# Patient Record
Sex: MALE | Race: BLACK OR AFRICAN AMERICAN | Employment: OTHER | URBAN - METROPOLITAN AREA
[De-identification: names, ages, dates, MRNs, and addresses within clinical notes are randomized per-mention and may not be internally consistent; named-entity substitution may affect disease eponyms.]

---

## 2018-04-18 ENCOUNTER — APPOINTMENT (OUTPATIENT)
Dept: GENERAL RADIOLOGY | Age: 73
DRG: 309 | End: 2018-04-18
Attending: EMERGENCY MEDICINE
Payer: MEDICARE

## 2018-04-18 ENCOUNTER — APPOINTMENT (OUTPATIENT)
Dept: CT IMAGING | Age: 73
DRG: 309 | End: 2018-04-18
Attending: EMERGENCY MEDICINE
Payer: MEDICARE

## 2018-04-18 ENCOUNTER — HOSPITAL ENCOUNTER (INPATIENT)
Age: 73
LOS: 5 days | Discharge: HOME HEALTH | DRG: 309 | End: 2018-04-24
Attending: EMERGENCY MEDICINE | Admitting: INTERNAL MEDICINE
Payer: MEDICARE

## 2018-04-18 DIAGNOSIS — Z96.651 S/P TKR (TOTAL KNEE REPLACEMENT), RIGHT: ICD-10-CM

## 2018-04-18 DIAGNOSIS — N18.9 ACUTE RENAL FAILURE SUPERIMPOSED ON CHRONIC KIDNEY DISEASE, UNSPECIFIED CKD STAGE, UNSPECIFIED ACUTE RENAL FAILURE TYPE (HCC): ICD-10-CM

## 2018-04-18 DIAGNOSIS — I47.29 VENTRICULAR TACHYCARDIA, NON-SUSTAINED: ICD-10-CM

## 2018-04-18 DIAGNOSIS — N17.9 ACUTE RENAL FAILURE SUPERIMPOSED ON CHRONIC KIDNEY DISEASE, UNSPECIFIED CKD STAGE, UNSPECIFIED ACUTE RENAL FAILURE TYPE (HCC): ICD-10-CM

## 2018-04-18 DIAGNOSIS — I48.91 ATRIAL FIBRILLATION WITH RAPID VENTRICULAR RESPONSE (HCC): Primary | ICD-10-CM

## 2018-04-18 LAB
ANION GAP SERPL CALC-SCNC: 9 MMOL/L (ref 3–18)
APTT PPP: 34.8 SEC (ref 23–36.4)
BASOPHILS # BLD: 0 K/UL (ref 0–0.06)
BASOPHILS NFR BLD: 0 % (ref 0–3)
BUN SERPL-MCNC: 51 MG/DL (ref 7–18)
BUN/CREAT SERPL: 15 (ref 12–20)
CALCIUM SERPL-MCNC: 8.9 MG/DL (ref 8.5–10.1)
CHLORIDE SERPL-SCNC: 98 MMOL/L (ref 100–108)
CO2 SERPL-SCNC: 26 MMOL/L (ref 21–32)
CREAT SERPL-MCNC: 3.4 MG/DL (ref 0.6–1.3)
DIFFERENTIAL METHOD BLD: ABNORMAL
EOSINOPHIL # BLD: 0.1 K/UL (ref 0–0.4)
EOSINOPHIL NFR BLD: 1 % (ref 0–5)
ERYTHROCYTE [DISTWIDTH] IN BLOOD BY AUTOMATED COUNT: 15.5 % (ref 11.6–14.5)
GLUCOSE SERPL-MCNC: 150 MG/DL (ref 74–99)
HCT VFR BLD AUTO: 33.6 % (ref 36–48)
HGB BLD-MCNC: 11.3 G/DL (ref 13–16)
INR PPP: 1.6 (ref 0.8–1.2)
LYMPHOCYTES # BLD: 1.1 K/UL (ref 0.8–3.5)
LYMPHOCYTES NFR BLD: 14 % (ref 20–51)
MCH RBC QN AUTO: 30.6 PG (ref 24–34)
MCHC RBC AUTO-ENTMCNC: 33.6 G/DL (ref 31–37)
MCV RBC AUTO: 91.1 FL (ref 74–97)
MONOCYTES # BLD: 0.6 K/UL (ref 0–1)
MONOCYTES NFR BLD: 7 % (ref 2–9)
NEUTS SEG # BLD: 6.4 K/UL (ref 1.8–8)
NEUTS SEG NFR BLD: 78 % (ref 42–75)
NRBC BLD-RTO: 6 PER 100 WBC
PLATELET # BLD AUTO: 196 K/UL (ref 135–420)
PLATELET COMMENTS,PCOM: ABNORMAL
PMV BLD AUTO: 11.5 FL (ref 9.2–11.8)
POTASSIUM SERPL-SCNC: 4.2 MMOL/L (ref 3.5–5.5)
PROTHROMBIN TIME: 18.6 SEC (ref 11.5–15.2)
RBC # BLD AUTO: 3.69 M/UL (ref 4.7–5.5)
RBC MORPH BLD: ABNORMAL
RBC MORPH BLD: ABNORMAL
SODIUM SERPL-SCNC: 133 MMOL/L (ref 136–145)
TROPONIN I SERPL-MCNC: 0.08 NG/ML (ref 0–0.04)
TROPONIN I SERPL-MCNC: 0.08 NG/ML (ref 0–0.04)
WBC # BLD AUTO: 8.2 K/UL (ref 4.6–13.2)

## 2018-04-18 PROCEDURE — 80048 BASIC METABOLIC PNL TOTAL CA: CPT | Performed by: PHYSICIAN ASSISTANT

## 2018-04-18 PROCEDURE — 85610 PROTHROMBIN TIME: CPT | Performed by: PHYSICIAN ASSISTANT

## 2018-04-18 PROCEDURE — 84484 ASSAY OF TROPONIN QUANT: CPT | Performed by: PHYSICIAN ASSISTANT

## 2018-04-18 PROCEDURE — 74011250637 HC RX REV CODE- 250/637: Performed by: EMERGENCY MEDICINE

## 2018-04-18 PROCEDURE — 93971 EXTREMITY STUDY: CPT

## 2018-04-18 PROCEDURE — 85730 THROMBOPLASTIN TIME PARTIAL: CPT | Performed by: EMERGENCY MEDICINE

## 2018-04-18 PROCEDURE — 96361 HYDRATE IV INFUSION ADD-ON: CPT

## 2018-04-18 PROCEDURE — 96366 THER/PROPH/DIAG IV INF ADDON: CPT

## 2018-04-18 PROCEDURE — 93005 ELECTROCARDIOGRAM TRACING: CPT

## 2018-04-18 PROCEDURE — 74011000258 HC RX REV CODE- 258: Performed by: EMERGENCY MEDICINE

## 2018-04-18 PROCEDURE — 85025 COMPLETE CBC W/AUTO DIFF WBC: CPT | Performed by: PHYSICIAN ASSISTANT

## 2018-04-18 PROCEDURE — 94762 N-INVAS EAR/PLS OXIMTRY CONT: CPT

## 2018-04-18 PROCEDURE — 96365 THER/PROPH/DIAG IV INF INIT: CPT

## 2018-04-18 PROCEDURE — 74022 RADEX COMPL AQT ABD SERIES: CPT

## 2018-04-18 PROCEDURE — 74011000250 HC RX REV CODE- 250: Performed by: EMERGENCY MEDICINE

## 2018-04-18 PROCEDURE — 74011250636 HC RX REV CODE- 250/636: Performed by: EMERGENCY MEDICINE

## 2018-04-18 PROCEDURE — 96375 TX/PRO/DX INJ NEW DRUG ADDON: CPT

## 2018-04-18 PROCEDURE — 96367 TX/PROPH/DG ADDL SEQ IV INF: CPT

## 2018-04-18 PROCEDURE — 99285 EMERGENCY DEPT VISIT HI MDM: CPT

## 2018-04-18 RX ORDER — DILTIAZEM HYDROCHLORIDE 5 MG/ML
25 INJECTION INTRAVENOUS
Status: COMPLETED | OUTPATIENT
Start: 2018-04-18 | End: 2018-04-18

## 2018-04-18 RX ORDER — DILTIAZEM HYDROCHLORIDE 5 MG/ML
20 INJECTION INTRAVENOUS
Status: COMPLETED | OUTPATIENT
Start: 2018-04-18 | End: 2018-04-18

## 2018-04-18 RX ORDER — GUAIFENESIN 100 MG/5ML
324 LIQUID (ML) ORAL
Status: COMPLETED | OUTPATIENT
Start: 2018-04-18 | End: 2018-04-18

## 2018-04-18 RX ORDER — HEPARIN SODIUM 10000 [USP'U]/100ML
18-36 INJECTION, SOLUTION INTRAVENOUS
Status: DISCONTINUED | OUTPATIENT
Start: 2018-04-18 | End: 2018-04-24 | Stop reason: HOSPADM

## 2018-04-18 RX ORDER — AMIODARONE HYDROCHLORIDE 150 MG/3ML
150 INJECTION, SOLUTION INTRAVENOUS ONCE
Status: COMPLETED | OUTPATIENT
Start: 2018-04-18 | End: 2018-04-18

## 2018-04-18 RX ORDER — AMIODARONE HCL/D5W 450 MG/250
0.5-1 PLASTIC BAG, INJECTION (ML) INTRAVENOUS CONTINUOUS
Status: DISCONTINUED | OUTPATIENT
Start: 2018-04-18 | End: 2018-04-18

## 2018-04-18 RX ADMIN — ASPIRIN 81 MG 324 MG: 81 TABLET ORAL at 21:44

## 2018-04-18 RX ADMIN — AMIODARONE HYDROCHLORIDE 0.5 MG/MIN: 1.8 INJECTION, SOLUTION INTRAVENOUS at 23:27

## 2018-04-18 RX ADMIN — SODIUM CHLORIDE 1000 ML: 900 INJECTION, SOLUTION INTRAVENOUS at 19:54

## 2018-04-18 RX ADMIN — AMIODARONE HYDROCHLORIDE 150 MG: 50 INJECTION, SOLUTION INTRAVENOUS at 23:23

## 2018-04-18 RX ADMIN — DILTIAZEM HYDROCHLORIDE 20 MG: 5 INJECTION INTRAVENOUS at 19:57

## 2018-04-18 RX ADMIN — SODIUM CHLORIDE 5 MG/HR: 900 INJECTION, SOLUTION INTRAVENOUS at 20:25

## 2018-04-18 RX ADMIN — SODIUM CHLORIDE 1000 ML: 900 INJECTION, SOLUTION INTRAVENOUS at 22:08

## 2018-04-18 RX ADMIN — HEPARIN SODIUM 18 UNITS/KG/HR: 10000 INJECTION, SOLUTION INTRAVENOUS at 21:46

## 2018-04-18 RX ADMIN — SODIUM CHLORIDE 1000 ML: 900 INJECTION, SOLUTION INTRAVENOUS at 20:45

## 2018-04-18 RX ADMIN — DILTIAZEM HYDROCHLORIDE 25 MG: 5 INJECTION INTRAVENOUS at 20:16

## 2018-04-18 NOTE — ED PROVIDER NOTES
EMERGENCY DEPARTMENT HISTORY AND PHYSICAL EXAM    7:40 PM      Date: 4/18/2018  Patient Name: Sonya Aldrich    History of Presenting Illness     No chief complaint on file. History Provided By: Patient    Chief Complaint: SOB  Duration:  Days  Timing:  Constant  Location: lungs  Modifying Factors: Pt had right knee surgery 4 days ago and has had his sob ever since. He believes that he was sent home from the hospital too soon. Associated Symptoms: vomiting and light headedness. Denies fever, back pain, abd pain, and CP. Additional History (Context): Sonya Aldrich is a 71 y.o. male with A FIB who presents with constant SOB over the last 4 days. Associated sx are vomiting and light headedness. Denies fever, back pain, abd pain, and CP. Pt had right knee surgery 4 days ago and has had his sob ever since. He believes that he was sent home from the hospital too soon. Pt is on Warfarin and was taking lopressor but stopped taking it. PCP: Berny Canales MD        Past History     Past Medical History:  No past medical history on file. Past Surgical History:  No past surgical history on file. Family History:  No family history on file. Social History:  Social History   Substance Use Topics    Smoking status: Not on file    Smokeless tobacco: Not on file    Alcohol use Not on file       Allergies:  No Known Allergies      Review of Systems       Review of Systems   Constitutional: Negative for fever. Respiratory: Positive for shortness of breath. Cardiovascular: Negative for chest pain. Gastrointestinal: Positive for vomiting. Negative for abdominal pain. Musculoskeletal: Negative for back pain. Neurological: Positive for light-headedness. All other systems reviewed and are negative.         Physical Exam     Visit Vitals    /87    Pulse (!) 152    Temp 98.3 °F (36.8 °C)    Resp 28    Ht 5' 11\" (1.803 m)    Wt 92.5 kg (204 lb)    SpO2 95%    BMI 28.45 kg/m2 Physical Exam   Constitutional:   General:  Well-developed, well-nourished, speaking in full sentences normal mentation not warm to touch nontoxic nondiaphoretic. Head:  Normocephalic atraumatic. Eyes:  Pupils midrange extraocular movements intact. No pallor or conjunctival injection. Nose:  No rhinorrhea, inspection grossly normal.    Ears:  Grossly normal to inspection, no discharge. Mouth:  Mucous membranes moist, no appreciable intraoral lesion. Neck/Back:  Trachea midline, no asymmetry. Chest:  Grossly normal inspection, symmetric chest rise. Pulmonary:  Clear to auscultation bilaterally no wheezes rhonchi or rales. Cardiovascular: Not regular, tachycardic. Abdomen: Soft, nontender, nondistended no guarding rebound or peritoneal signs. Extremities:  Grossly normal to inspection, peripheral pulses intact  bilateral lower extremity is in compression stockings, 2+ dorsalis pedis pulses. Dressing is clean dry intact and there is no calf tenderness no asymmetry appreciated  Neurologic:  Alert and oriented no appreciable focal neurologic deficit. Skin:  Warm and dry  Psychiatric:  Grossly normal mood and affect. Nursing note reviewed, vital signs reviewed.            Diagnostic Study Results     Labs -  Recent Results (from the past 12 hour(s))   EKG, 12 LEAD, INITIAL    Collection Time: 04/18/18  7:39 PM   Result Value Ref Range    Ventricular Rate 158 BPM    Atrial Rate 163 BPM    QRS Duration 78 ms    Q-T Interval 296 ms    QTC Calculation (Bezet) 480 ms    Calculated R Axis -16 degrees    Calculated T Axis 74 degrees    Diagnosis       Atrial fibrillation with rapid ventricular response with premature   ventricular or aberrantly conducted complexes  Abnormal ECG  No previous ECGs available     CBC WITH AUTOMATED DIFF    Collection Time: 04/18/18  7:49 PM   Result Value Ref Range    WBC 8.2 4.6 - 13.2 K/uL    RBC 3.69 (L) 4.70 - 5.50 M/uL    HGB 11.3 (L) 13.0 - 16.0 g/dL    HCT 33.6 (L) 36.0 - 48.0 %    MCV 91.1 74.0 - 97.0 FL    MCH 30.6 24.0 - 34.0 PG    MCHC 33.6 31.0 - 37.0 g/dL    RDW 15.5 (H) 11.6 - 14.5 %    PLATELET 985 158 - 975 K/uL    MPV 11.5 9.2 - 11.8 FL    NEUTROPHILS 78 (H) 42 - 75 %    LYMPHOCYTES 14 (L) 20 - 51 %    MONOCYTES 7 2 - 9 %    EOSINOPHILS 1 0 - 5 %    BASOPHILS 0 0 - 3 %    NRBC 6.0 (H) 0  WBC    ABS. NEUTROPHILS 6.4 1.8 - 8.0 K/UL    ABS. LYMPHOCYTES 1.1 0.8 - 3.5 K/UL    ABS. MONOCYTES 0.6 0 - 1.0 K/UL    ABS. EOSINOPHILS 0.1 0.0 - 0.4 K/UL    ABS. BASOPHILS 0.0 0.0 - 0.06 K/UL    DF MANUAL      PLATELET COMMENTS ADEQUATE PLATELETS      RBC COMMENTS ANISOCYTOSIS  1+        RBC COMMENTS POLYCHROMASIA  1+       METABOLIC PANEL, BASIC    Collection Time: 04/18/18  7:49 PM   Result Value Ref Range    Sodium 133 (L) 136 - 145 mmol/L    Potassium 4.2 3.5 - 5.5 mmol/L    Chloride 98 (L) 100 - 108 mmol/L    CO2 26 21 - 32 mmol/L    Anion gap 9 3.0 - 18 mmol/L    Glucose 150 (H) 74 - 99 mg/dL    BUN 51 (H) 7.0 - 18 MG/DL    Creatinine 3.40 (H) 0.6 - 1.3 MG/DL    BUN/Creatinine ratio 15 12 - 20      GFR est AA 22 (L) >60 ml/min/1.73m2    GFR est non-AA 18 (L) >60 ml/min/1.73m2    Calcium 8.9 8.5 - 10.1 MG/DL   PROTHROMBIN TIME + INR    Collection Time: 04/18/18  7:49 PM   Result Value Ref Range    Prothrombin time 18.6 (H) 11.5 - 15.2 sec    INR 1.6 (H) 0.8 - 1.2     TROPONIN I    Collection Time: 04/18/18  7:49 PM   Result Value Ref Range    Troponin-I, Qt. 0.08 (H) 0.0 - 0.045 NG/ML   PTT    Collection Time: 04/18/18  7:49 PM   Result Value Ref Range    aPTT 34.8 23.0 - 36.4 SEC       Radiologic Studies -   DUPLEX LOWER EXT VENOUS RIGHT         XR ABD ACUTE W 1 V CHEST   Final Result      NM LUNG PERFUSION DONAVAN DIFF    (Results Pending)         Medical Decision Making   I am the first provider for this patient.     I reviewed the vital signs, available nursing notes, past medical history, past surgical history, family history and social history. Vital Signs-Reviewed the patient's vital signs. ED course:  Patient presents with rapid atrial fibrillation found by his home health visiting nurse. Had total knee replacement done April 11, primary concern is for PE, he also has nausea and vomiting unable tolerate his oral Lopressor history of A. fib and on Coumadin. He could also have a recurrence of his A. fib because of his medication noncompliance, his abdomen is completely benign less suspicious for intra-abdominal process. We'll continue to monitor. Abdominal pain was preceded by vomiting, he had no abdominal pain to start, no tenderness at this time, doubtful a primary intra-abdominal process    EMR reviewed, last EF 50%    Monitor: Rapid atrial fibrillation    EKG done at 1939 hrs.: Atrial fibrillation heart rate is 158, rapid ventricular response, no ST changes after initial bolus of diltiazem heart rate in the 120s to 150s, blood pressure not hypotensive we'll rebolus and start drip    After 2nd bolus, blood pressure transiently hypotensive, started diltiazem drip since his blood pressures come back up to the high 90s he remains asymptomatic. Labs:  creatinine was 3.4 potassium 4.2, prior at [facility] was milligrams of 1.8, is acute on chronic renal failure hemoglobin 11.3 white count 8.2 which is not elevated. His INR was subtherapeutic at 1.6     Consult:  Discussed care with MINDY Soto. Standard discussion; including history of patients chief complaint, available diagnostic results, and treatment course. Agree with empiric heparinization    Unable to perform CTA, will  perform duplex of lower extremity and plan for admission    Abdomen reexamined, completely benign no guarding rebound or peritoneal signs, had a watery stool today, passing\" nothing but gas\" recently.   We'll suspicion for bowel obstruction we'll plan for chest and abdomen x-ray this pupils      Reevaluated at 2100 hrs., heart rate is in the 150s blood pressure 139/110, slowly titrating her diltiazem because of episode of hypotension which bleeding is more related to his point depletion from his vomiting. Reevaluated at 2200 hrs.: Persistent tachycardic in the 130s blood pressure 109/93      Patient's presentation, history, physical exam and laboratory evaluations were reviewed. I felt the patient would benefit from inpatient management and treatment. Consult:  Discussed care with Dr. Stephen Dang. Standard discussion; including history of patients chief complaint, available diagnostic results, and treatment course. Patient was accepted to their service. Consult:  Discussed care with Hayden Salazar. Standard discussion; including history of patients chief complaint, available diagnostic results, and treatment course. Disposition:    Admitted to intensive care unit      Portions of this chart were created with Dragon medical speech to text program.   Unrecognized errors may be present. I have spent 65 minutes of critical care time (excluding time for other separate services) involved in lab review, consultations with specialist, family decision-making, and documentation. During this entire length of time I was immediately available to the patient. Critical Care: The reason for providing this level of medical care for this critically ill patient was due a critical illness that impaired one or more vital organ systems such that there was a high probability of imminent or life threatening deterioration in the patients condition. This care involved high complexity decision making to assess, manipulate, and support vital system functions, to treat this degree of vital organ system failure and to prevent further life threatening deterioration of the patients condition.          Follow-up Information     None           Patient's Medications    No medications on file     _______________________________    Attestations:  [address] [place] [address] Guru Mccarthy acting as a scribe for and in the presence of Doni Espino MD      April 18, 2018 at 7:40 PM       Provider Attestation:      I personally performed the services described in the documentation, reviewed the documentation, as recorded by the scribe in my presence, and it accurately and completely records my words and actions.  April 18, 2018 at 7:40 PM - Doni Espino MD    _______________________________

## 2018-04-18 NOTE — IP AVS SNAPSHOT
[address] Patient: Sharda Rolle MRN: [id number] :1945 A check horacio indicates which time of day the medication should be taken. My Medications START taking these medications Instructions Each Dose to Equal  
 Morning Noon Evening Bedtime  
 bisacodyl 10 mg suppository Commonly known as:  DULCOLAX Your last dose was: Your next dose is: Insert 10 mg into rectum daily as needed. 10 mg  
    
   
   
   
  
 bumetanide 1 mg tablet Commonly known as:  Melia Booth Your last dose was: Your next dose is: Take 1 Tab by mouth two (2) times a day. 1 mg  
    
   
   
   
  
 dilTIAZem  mg ER capsule Commonly known as:  CARDIZEM CD Your last dose was: Your next dose is: Take 1 Cap by mouth daily. 180 mg  
    
   
   
   
  
 docusate sodium 100 mg capsule Commonly known as:  Ki Hughes Your last dose was: Your next dose is: Take 1 Cap by mouth two (2) times a day. 100 mg  
    
   
   
   
  
 metoprolol tartrate 100 mg IR tablet Commonly known as:  LOPRESSOR Your last dose was: Your next dose is: Take 1 Tab by mouth every twelve (12) hours. 100 mg  
    
   
   
   
  
 OTHER Your last dose was: Your next dose is:    
   
   
 Check CBC, CMP, Mg. PT, INR on 18- Results to PCP immediately. Diagnosis- A fib OTHER Your last dose was: Your next dose is:    
   
   
 Incentive Spirometry- Use as directed  
     
   
   
   
  
 oxyCODONE-acetaminophen 5-325 mg per tablet Commonly known as:  PERCOCET Your last dose was: Your next dose is: Take 1 Tab by mouth every six (6) hours as needed. Max Daily Amount: 4 Tabs. 1 Tab tiotropium 18 mcg inhalation capsule Commonly known as:  Alvera Romberg Your last dose was: Your next dose is: Take 1 Cap by inhalation daily. 1 Cap  
    
   
   
   
  
 warfarin 6 mg tablet Commonly known as:  COUMADIN Your last dose was: Your next dose is:    
   
   
 6 mg PO daily through 4/26/18, and then as per PCP recommendations Where to Get Your Medications Information on where to get these meds will be given to you by the nurse or doctor. ! Ask your nurse or doctor about these medications  
  bisacodyl 10 mg suppository  
 bumetanide 1 mg tablet  
 dilTIAZem  mg ER capsule  
 docusate sodium 100 mg capsule  
 metoprolol tartrate 100 mg IR tablet OTHER  
 OTHER  
 oxyCODONE-acetaminophen 5-325 mg per tablet  
 tiotropium 18 mcg inhalation capsule  
 warfarin 6 mg tablet

## 2018-04-18 NOTE — IP AVS SNAPSHOT
Braeden Johnson 
 
 
 [address] Patient: Karely Santiago MRN: [id number] :1945 About your hospitalization You were admitted on:  2018 You last received care in the:  [address] [place] [address] You were discharged on:  2018 Why you were hospitalized Your primary diagnosis was:  Rapid Atrial Fibrillation (Hcc) Your diagnoses also included:  Knee Pain, Atrial Fibrillation With Rapid Ventricular Response (Hcc), Acute Kidney Injury (Hcc) Follow-up Information Follow up With Details Comments Contact Info Tess Miller MD On 5/3/2018 @1130am [address] [place] [address] [address] [facility] [address] 
[phone] Discharge Orders None A check horacio indicates which time of day the medication should be taken. My Medications START taking these medications Instructions Each Dose to Equal  
 Morning Noon Evening Bedtime  
 bisacodyl 10 mg suppository Commonly known as:  DULCOLAX Your last dose was: Your next dose is: Insert 10 mg into rectum daily as needed. 10 mg  
    
   
   
   
  
 bumetanide 1 mg tablet Commonly known as:  Gonzalez Patron Your last dose was: Your next dose is: Take 1 Tab by mouth two (2) times a day. 1 mg  
    
   
   
   
  
 dilTIAZem  mg ER capsule Commonly known as:  CARDIZEM CD Your last dose was: Your next dose is: Take 1 Cap by mouth daily. 180 mg  
    
   
   
   
  
 docusate sodium 100 mg capsule Commonly known as:  Paola Busing Your last dose was: Your next dose is: Take 1 Cap by mouth two (2) times a day. 100 mg  
    
   
   
   
  
 metoprolol tartrate 100 mg IR tablet Commonly known as:  LOPRESSOR Your last dose was: Your next dose is: Take 1 Tab by mouth every twelve (12) hours.   
 100 mg  
    
 OTHER Your last dose was: Your next dose is:    
   
   
 Check CBC, CMP, Mg. PT, INR on 4/26/18- Results to PCP immediately. Diagnosis- A fib OTHER Your last dose was: Your next dose is:    
   
   
 Incentive Spirometry- Use as directed  
     
   
   
   
  
 oxyCODONE-acetaminophen 5-325 mg per tablet Commonly known as:  PERCOCET Your last dose was: Your next dose is: Take 1 Tab by mouth every six (6) hours as needed. Max Daily Amount: 4 Tabs. 1 Tab  
    
   
   
   
  
 tiotropium 18 mcg inhalation capsule Commonly known as:  Angela Bye Your last dose was: Your next dose is: Take 1 Cap by inhalation daily. 1 Cap  
    
   
   
   
  
 warfarin 6 mg tablet Commonly known as:  COUMADIN Your last dose was: Your next dose is:    
   
   
 6 mg PO daily through 4/26/18, and then as per PCP recommendations Where to Get Your Medications Information on where to get these meds will be given to you by the nurse or doctor. ! Ask your nurse or doctor about these medications  
  bisacodyl 10 mg suppository  
 bumetanide 1 mg tablet  
 dilTIAZem  mg ER capsule  
 docusate sodium 100 mg capsule  
 metoprolol tartrate 100 mg IR tablet OTHER  
 OTHER  
 oxyCODONE-acetaminophen 5-325 mg per tablet  
 tiotropium 18 mcg inhalation capsule  
 warfarin 6 mg tablet Opioid Education Prescription Opioids: What You Need to Know: 
 
Prescription opioids can be used to help relieve moderate-to-severe pain and are often prescribed following a surgery or injury, or for certain health conditions. These medications can be an important part of treatment but also come with serious risks. Opioids are strong pain medicines.  Examples include hydrocodone, oxycodone, fentanyl, and morphine. Heroin is an example of an illegal opioid. It is important to work with your health care provider to make sure you are getting the safest, most effective care. WHAT ARE THE RISKS AND SIDE EFFECTS OF OPIOID USE? Prescription opioids carry serious risks of addiction and overdose, especially with prolonged use. An opioid overdose, often marked by slow breathing, can cause sudden death. The use of prescription opioids can have a number of side effects as well, even when taken as directed. · Tolerance-meaning you might need to take more of a medication for the same pain relief · Physical dependence-meaning you have symptoms of withdrawal when the medication is stopped. Withdrawal symptoms can include nausea, sweating, chills, diarrhea, stomach cramps, and muscle aches. Withdrawal can last up to several weeks, depending on which drug you took and how long you took it. · Increased sensitivity to pain · Constipation · Nausea, vomiting, and dry mouth · Sleepiness and dizziness · Confusion · Depression · Low levels of testosterone that can result in lower sex drive, energy, and strength · Itching and sweating RISKS ARE GREATER WITH:      
· History of drug misuse, substance use disorder, or overdose · Mental health conditions (such as depression or anxiety) · Sleep apnea · Older age (72 years or older) · Pregnancy Avoid alcohol while taking prescription opioids. Also, unless specifically advised by your health care provider, medications to avoid include: · Benzodiazepines (such as Xanax or Valium) · Muscle relaxants (such as Soma or Flexeril) · Hypnotics (such as Ambien or Lunesta) · Other prescription opioids KNOW YOUR OPTIONS Talk to your health care provider about ways to manage your pain that don't involve prescription opioids. Some of these options may actually work better and have fewer risks and side effects. Options may include: · Pain relievers such as acetaminophen, ibuprofen, and naproxen · Some medications that are also used for depression or seizures · Physical therapy and exercise · Counseling to help patients learn how to cope better with triggers of pain and stress. · Application of heat or cold compress · Massage therapy · Relaxation techniques Be Informed Make sure you know the name of your medication, how much and how often to take it, and its potential risks & side effects. IF YOU ARE PRESCRIBED OPIOIDS FOR PAIN: 
· Never take opioids in greater amounts or more often than prescribed. Remember the goal is not to be pain-free but to manage your pain at a tolerable level. · Follow up with your primary care provider to: · Work together to create a plan on how to manage your pain. · Talk about ways to help manage your pain that don't involve prescription opioids. · Talk about any and all concerns and side effects. · Help prevent misuse and abuse. · Never sell or share prescription opioids · Help prevent misuse and abuse. · Store prescription opioids in a secure place and out of reach of others (this may include visitors, children, friends, and family). · Safely dispose of unused/unwanted prescription opioids: Find your community drug take-back program or your pharmacy mail-back program, or flush them down the toilet, following guidance from the Food and Drug Administration (www.fda.gov/Drugs/ResourcesForYou). · Visit www.cdc.gov/drugoverdose to learn about the risks of opioid abuse and overdose. · If you believe you may be struggling with addiction, tell your health care provider and ask for guidance or call [facility] at 4-385-588-YNMO. Discharge Instructions Atrial Fibrillation: Care Instructions Your Care Instructions Atrial fibrillation is an irregular and often fast heartbeat.  Treating this condition is important for several reasons. It can cause blood clots, which can travel from your heart to your brain and cause a stroke. If you have a fast heartbeat, you may feel lightheaded, dizzy, and weak. An irregular heartbeat can also increase your risk for heart failure. Atrial fibrillation is often the result of another heart condition, such as high blood pressure or coronary artery disease. Making changes to improve your heart condition will help you stay healthy and active. Follow-up care is a key part of your treatment and safety. Be sure to make and go to all appointments, and call your doctor if you are having problems. It's also a good idea to know your test results and keep a list of the medicines you take. How can you care for yourself at home? Medicines ? · Take your medicines exactly as prescribed. Call your doctor if you think you are having a problem with your medicine. You will get more details on the specific medicines your doctor prescribes. ? · If your doctor has given you a blood thinner to prevent a stroke, be sure you get instructions about how to take your medicine safely. Blood thinners can cause serious bleeding problems. ? · Do not take any vitamins, over-the-counter drugs, or herbal products without talking to your doctor first. ? Lifestyle changes ? · Do not smoke. Smoking can increase your chance of a stroke and heart attack. If you need help quitting, talk to your doctor about stop-smoking programs and medicines. These can increase your chances of quitting for good. ? · Eat a heart-healthy diet. ? · Stay at a healthy weight. Lose weight if you need to.  
? · Limit alcohol to 2 drinks a day for men and 1 drink a day for women. Too much alcohol can cause health problems. ? · Avoid colds and flu. Get a pneumococcal vaccine shot. If you have had one before, ask your doctor whether you need another dose.  Get a flu shot every year. If you must be around people with colds or flu, wash your hands often. Activity ? · If your doctor recommends it, get more exercise. Walking is a good choice. Bit by bit, increase the amount you walk every day. Try for at least 30 minutes on most days of the week. You also may want to swim, bike, or do other activities. Your doctor may suggest that you join a cardiac rehabilitation program so that you can have help increasing your physical activity safely. ? · Start light exercise if your doctor says it is okay. Even a small amount will help you get stronger, have more energy, and manage stress. Walking is an easy way to get exercise. Start out by walking a little more than you did in the hospital. Gradually increase the amount you walk. ? · When you exercise, watch for signs that your heart is working too hard. You are pushing too hard if you cannot talk while you are exercising. If you become short of breath or dizzy or have chest pain, sit down and rest immediately. ? · Check your pulse regularly. Place two fingers on the artery at the palm side of your wrist, in line with your thumb. If your heartbeat seems uneven or fast, talk to your doctor. When should you call for help? Call 911 anytime you think you may need emergency care. For example, call if: 
? · You have symptoms of a heart attack. These may include: ¨ Chest pain or pressure, or a strange feeling in the chest. 
¨ Sweating. ¨ Shortness of breath. ¨ Nausea or vomiting. ¨ Pain, pressure, or a strange feeling in the back, neck, jaw, or upper belly or in one or both shoulders or arms. ¨ Lightheadedness or sudden weakness. ¨ A fast or irregular heartbeat. After you call 911, the  may tell you to chew 1 adult-strength or 2 to 4 low-dose aspirin. Wait for an ambulance. Do not try to drive yourself. ? · You have symptoms of a stroke. These may include: ¨ Sudden numbness, tingling, weakness, or loss of movement in your face, arm, or leg, especially on only one side of your body. ¨ Sudden vision changes. ¨ Sudden trouble speaking. ¨ Sudden confusion or trouble understanding simple statements. ¨ Sudden problems with walking or balance. ¨ A sudden, severe headache that is different from past headaches. ? · You passed out (lost consciousness). ?Call your doctor now or seek immediate medical care if: 
? · You have new or increased shortness of breath. ? · You feel dizzy or lightheaded, or you feel like you may faint. ? · Your heart rate becomes irregular. ? · You can feel your heart flutter in your chest or skip heartbeats. Tell your doctor if these symptoms are new or worse. ? Watch closely for changes in your health, and be sure to contact your doctor if you have any problems. Where can you learn more? Go to http://vikash-tulio.info/. Enter U020 in the search box to learn more about \"Atrial Fibrillation: Care Instructions. \" Current as of: September 21, 2016 Content Version: 11.4 © 8178-0704 VILOOP. Care instructions adapted under license by [employer] (which disclaims liability or warranty for this information). If you have questions about a medical condition or this instruction, always ask your healthcare professional. [place] [address] any warranty or liability for your use of this information. Learning About Atrial Fibrillation What is atrial fibrillation? Atrial fibrillation (say \"AY-tree-joão kfu-dknv-SRC-shun\") is the most common type of irregular heartbeat (arrhythmia). Normally, the heart beats in a strong, steady rhythm. In atrial fibrillation, a problem with the heart's electrical system causes the two upper parts of the heart (the atria) to quiver, or fibrillate.  Your heart rate also may be faster than normal. 
 Atrial fibrillation can be dangerous because if the heartbeat isn't strong and steady, blood can collect, or pool, in the atria. And pooled blood is more likely to form clots. Clots can travel to the brain, block blood flow, and cause a stroke. Atrial fibrillation can also lead to heart failure. Treatment for atrial fibrillation helps prevent stroke and heart failure. It also helps relieve symptoms. Atrial fibrillation is often caused by another heart problem. It may happen after heart surgery. It may also be caused by other problems, such as an overactive thyroid gland or lung disease. Many people with atrial fibrillation are able to live full and active lives. What are the symptoms? Some people feel symptoms when they have episodes of atrial fibrillation. But other people don't notice any symptoms. If you have symptoms, you may feel: · A fluttering, racing, or pounding feeling in your chest called palpitations. · Weak or tired. · Dizzy or lightheaded. · Short of breath. · Chest pain. · Confused. You may notice signs of atrial fibrillation when you check your pulse. Your pulse may seem uneven or fast. 
What can you expect when you have atrial fibrillation? At first, spells of atrial fibrillation may come on suddenly and last a short time. It may go away on its own or it goes away after treatment. This is called paroxysmal atrial fibrillation. Over time, the spells may last longer and occur more often. They often don't go away on their own. How is it treated? Treatments can help you feel better and prevent future problems, especially stroke and heart failure. The main types of treatment slow the heart rate, control the heart rhythm, and help prevent stroke. Your treatment will depend on the cause of your atrial fibrillation, your symptoms, and your risk for stroke. · Heart rate treatment. Medicine may be used to slow your heart rate.  Your heartbeat may still be irregular. But these medicines keep your heart from beating too fast. They may also help relieve your symptoms. · Heart rhythm treatment. Different treatments may be used to try to stop atrial fibrillation and keep it from returning. They can also relieve symptoms. These treatments include medicine, electrical cardioversion to shock the heart back to a normal rhythm, a procedure called catheter ablation, and heart surgery. · Stroke prevention. You and your doctor can decide how to lower your risk. You may decide to take a blood-thinning medicine, such as aspirin or an anticoagulant. How can you live well with it? You can live well and help manage atrial fibrillation by having a heart-healthy lifestyle. To have a heart-healthy lifestyle: · Don't smoke. · Eat heart-healthy foods. · Be active. Talk to your doctor about what type and level of exercise is safe for you. · Stay at a healthy weight. Lose weight if you need to. · Manage stress. · Avoid alcohol if it triggers symptoms. · Manage other health problems such as high blood pressure, high cholesterol, and diabetes. · Avoid getting sick from the flu. Get a flu shot every year. Where can you learn more? Go to http://vikashXenSourcetulio.info/. Enter [phone] in the search box to learn more about \"Learning About Atrial Fibrillation. \" Current as of: September 21, 2016 Content Version: 11.4 © 3356-3752 1010data. Care instructions adapted under license by [employer] (which disclaims liability or warranty for this information). If you have questions about a medical condition or this instruction, always ask your healthcare professional. [place] [address] any warranty or liability for your use of this information. Introducing \[facility] [place] [facility]\"" & HEALTH SERVICES!    
 [facility] [employer] introduces Apiary patient portal. Now you can access parts of your medical record, email your doctor's office, and request medication refills online. 1. In your internet browser, go to [email or web address] 2. Click on the First Time User? Click Here link in the Sign In box. You will see the New Member Sign Up page. 3. Enter your Tensorcom Access Code exactly as it appears below. You will not need to use this code after youve completed the sign-up process. If you do not sign up before the expiration date, you must request a new code. · Tensorcom Access Code: [email or web address] Expires: 7/17/2018  7:46 PM 
 
4. Enter the last four digits of your Social Security Number (xxxx) and Date of Birth (mm/dd/yyyy) as indicated and click Submit. You will be taken to the next sign-up page. 5. Create a Tensorcom ID. This will be your Tensorcom login ID and cannot be changed, so think of one that is secure and easy to remember. 6. Create a Tensorcom password. You can change your password at any time. 7. Enter your Password Reset Question and Answer. This can be used at a later time if you forget your password. 8. Enter your e-mail address. You will receive e-mail notification when new information is available in [address] 9. Click Sign Up. You can now view and download portions of your medical record. 10. Click the Download Summary menu link to download a portable copy of your medical information. If you have questions, please visit the Frequently Asked Questions section of the Tensorcom website. Remember, Tensorcom is NOT to be used for urgent needs. For medical emergencies, dial 911. Now available from your iPhone and Android! Introducing Eddy Valadez As a [facility] patient, I wanted to make you aware of our electronic visit tool called Eddy Valadez. [facility] 24/7 allows you to connect within minutes with a medical provider 24 hours a day, seven days a week via a mobile device or tablet or logging into a secure website from your computer. You can access Scoopinion from anywhere in the [place]. A virtual visit might be right for you when you have a simple condition and feel like you just dont want to get out of bed, or cant get away from work for an appointment, when your regular [facility] provider is not available (evenings, weekends or holidays), or when youre out of town and need minor care. Electronic visits cost only $49 and if the SuttonSOLARBRUSH 24/TopVisible provider determines a prescription is needed to treat your condition, one can be electronically transmitted to a nearby pharmacy*. Please take a moment to enroll today if you have not already done so. The enrollment process is free and takes just a few minutes. To enroll, please download the Widemile tami to your tablet or phone, or visit [email or web address]."[email or web address]. org to enroll on your computer. And, as an [address] [place] [address] patient with a GranData account, the results of your visits will be scanned into your electronic medical record and your primary care provider will be able to view the scanned results. We urge you to continue to see your regular [facility] provider for your ongoing medical care. And while your primary care provider may not be the one available when you seek a Trony Solarjacekfin virtual visit, the peace of mind you get from getting a real diagnosis real time can be priceless. For more information on Scoopinion, view our Frequently Asked Questions (FAQs) at [email or web address]."[email or web address][employer] Inc.". org. Sincerely, 
 
Darline Freitas MD 
Chief Medical Officer Fitz Schroeder *:  certain medications cannot be prescribed via Scoopinion Unresulted Labs-Please follow up with your PCP about these lab tests Order Current Status CBC WITH AUTOMATED DIFF Preliminary result Providers Seen During Your Hospitalization Provider Specialty Primary office phone Patricia Charles MD Emergency Medicine [phone] Felice Restrepo MD Emergency Medicine [phone] Nora Be MD Internal Medicine [phone] Orly Rodrigues MD Internal Medicine [phone] Your Primary Care Physician (PCP) Primary Care Physician Office Phone Office Fax Ok Rodriguez [phone] You are allergic to the following No active allergies Recent Documentation Height Weight BMI Smoking Status 1.803 m 90.7 kg 27.89 kg/m2 Never Assessed Emergency Contacts Name Discharge Info Relation Home Work Mobile Sherwin Triana DISCHARGE CAREGIVER [3] Son [22] [phone] Shelbie Triana  Spouse [3] [id number] Patient Belongings The following personal items are in your possession at time of discharge: 
  Dental Appliances: None  Visual Aid: Glasses, With patient      Home Medications: None   Jewelry: None  Clothing: None    Other Valuables: None  Personal Items Sent to Safe: n Please provide this summary of care documentation to your next provider. Signatures-by signing, you are acknowledging that this After Visit Summary has been reviewed with you and you have received a copy. Patient Signature:  ____________________________________________________________ Date:  ____________________________________________________________  
  
Johnny Meza Provider Signature:  ____________________________________________________________ Date:  ____________________________________________________________

## 2018-04-18 NOTE — Clinical Note
Status[de-identified] Inpatient [101] Type of Bed: Stepdown [17] Inpatient Hospitalization Certified Necessary for the Following Reasons: 9. Other (further clarification in H&P documentation) Ad[facility]: [facility] [[id number]] Admitting Physician: Willie Trejo [[id number]] Attending Physician: Willie Trejo [[id number]] Estimated Length of Stay: 2 Midnights Discharge Plan[de-identified] Home with Office Follow-up

## 2018-04-18 NOTE — ED TRIAGE NOTES
PT arrived to ED via EMS. Per EMS, pt had total knee replacement on April 11, 2018 at [facility] and was discharged on Saturday and has been having shortness of breath and vomiting since discharge. Pt is alert and oriented.

## 2018-04-19 ENCOUNTER — APPOINTMENT (OUTPATIENT)
Dept: NUCLEAR MEDICINE | Age: 73
DRG: 309 | End: 2018-04-19
Attending: EMERGENCY MEDICINE
Payer: MEDICARE

## 2018-04-19 ENCOUNTER — APPOINTMENT (OUTPATIENT)
Dept: GENERAL RADIOLOGY | Age: 73
DRG: 309 | End: 2018-04-19
Attending: NURSE PRACTITIONER
Payer: MEDICARE

## 2018-04-19 PROBLEM — M25.569 KNEE PAIN: Status: ACTIVE | Noted: 2018-04-19

## 2018-04-19 PROBLEM — I48.91 ATRIAL FIBRILLATION WITH RAPID VENTRICULAR RESPONSE (HCC): Status: ACTIVE | Noted: 2018-04-19

## 2018-04-19 PROBLEM — N17.9 ACUTE KIDNEY INJURY (HCC): Status: ACTIVE | Noted: 2018-04-19

## 2018-04-19 LAB
ANION GAP SERPL CALC-SCNC: 9 MMOL/L (ref 3–18)
APTT PPP: 81.2 SEC (ref 23–36.4)
ATRIAL RATE: 163 BPM
ATRIAL RATE: 174 BPM
BASOPHILS # BLD: 0 K/UL (ref 0–0.1)
BASOPHILS NFR BLD: 0 % (ref 0–2)
BUN SERPL-MCNC: 51 MG/DL (ref 7–18)
BUN/CREAT SERPL: 19 (ref 12–20)
CALCIUM SERPL-MCNC: 7.7 MG/DL (ref 8.5–10.1)
CALCULATED R AXIS, ECG10: -16 DEGREES
CALCULATED R AXIS, ECG10: -27 DEGREES
CALCULATED T AXIS, ECG11: 64 DEGREES
CALCULATED T AXIS, ECG11: 74 DEGREES
CHLORIDE SERPL-SCNC: 105 MMOL/L (ref 100–108)
CK MB CFR SERPL CALC: 1.8 % (ref 0–4)
CK MB CFR SERPL CALC: 1.9 % (ref 0–4)
CK MB CFR SERPL CALC: 2 % (ref 0–4)
CK MB SERPL-MCNC: 2.3 NG/ML (ref 5–25)
CK MB SERPL-MCNC: 2.5 NG/ML (ref 5–25)
CK MB SERPL-MCNC: 2.7 NG/ML (ref 5–25)
CK SERPL-CCNC: 121 U/L (ref 39–308)
CK SERPL-CCNC: 137 U/L (ref 39–308)
CK SERPL-CCNC: 139 U/L (ref 39–308)
CO2 SERPL-SCNC: 23 MMOL/L (ref 21–32)
CREAT SERPL-MCNC: 2.62 MG/DL (ref 0.6–1.3)
DIAGNOSIS, 93000: NORMAL
DIAGNOSIS, 93000: NORMAL
DIFFERENTIAL METHOD BLD: ABNORMAL
EOSINOPHIL # BLD: 0.1 K/UL (ref 0–0.4)
EOSINOPHIL NFR BLD: 1 % (ref 0–5)
ERYTHROCYTE [DISTWIDTH] IN BLOOD BY AUTOMATED COUNT: 15.5 % (ref 11.6–14.5)
GLUCOSE SERPL-MCNC: 129 MG/DL (ref 74–99)
HCT VFR BLD AUTO: 30.2 % (ref 36–48)
HGB BLD-MCNC: 10.1 G/DL (ref 13–16)
INR PPP: 1.7 (ref 0.8–1.2)
LYMPHOCYTES # BLD: 0.9 K/UL (ref 0.9–3.6)
LYMPHOCYTES NFR BLD: 12 % (ref 21–52)
MCH RBC QN AUTO: 30.5 PG (ref 24–34)
MCHC RBC AUTO-ENTMCNC: 33.4 G/DL (ref 31–37)
MCV RBC AUTO: 91.2 FL (ref 74–97)
MONOCYTES # BLD: 0.7 K/UL (ref 0.05–1.2)
MONOCYTES NFR BLD: 9 % (ref 3–10)
NEUTS SEG # BLD: 6.2 K/UL (ref 1.8–8)
NEUTS SEG NFR BLD: 78 % (ref 40–73)
PHOSPHATE SERPL-MCNC: 3.2 MG/DL (ref 2.5–4.9)
PLATELET # BLD AUTO: 169 K/UL (ref 135–420)
PMV BLD AUTO: 11.4 FL (ref 9.2–11.8)
POTASSIUM SERPL-SCNC: 4.3 MMOL/L (ref 3.5–5.5)
PROTHROMBIN TIME: 19.7 SEC (ref 11.5–15.2)
Q-T INTERVAL, ECG07: 262 MS
Q-T INTERVAL, ECG07: 296 MS
QRS DURATION, ECG06: 74 MS
QRS DURATION, ECG06: 78 MS
QTC CALCULATION (BEZET), ECG08: 427 MS
QTC CALCULATION (BEZET), ECG08: 480 MS
RBC # BLD AUTO: 3.31 M/UL (ref 4.7–5.5)
SODIUM SERPL-SCNC: 137 MMOL/L (ref 136–145)
TROPONIN I SERPL-MCNC: 0.04 NG/ML (ref 0–0.04)
VENTRICULAR RATE, ECG03: 158 BPM
VENTRICULAR RATE, ECG03: 160 BPM
WBC # BLD AUTO: 7.9 K/UL (ref 4.6–13.2)

## 2018-04-19 PROCEDURE — 84100 ASSAY OF PHOSPHORUS: CPT | Performed by: NURSE PRACTITIONER

## 2018-04-19 PROCEDURE — 74011250636 HC RX REV CODE- 250/636: Performed by: EMERGENCY MEDICINE

## 2018-04-19 PROCEDURE — 74011250636 HC RX REV CODE- 250/636: Performed by: PHYSICIAN ASSISTANT

## 2018-04-19 PROCEDURE — 85025 COMPLETE CBC W/AUTO DIFF WBC: CPT | Performed by: NURSE PRACTITIONER

## 2018-04-19 PROCEDURE — 74011000250 HC RX REV CODE- 250: Performed by: NURSE PRACTITIONER

## 2018-04-19 PROCEDURE — 85610 PROTHROMBIN TIME: CPT | Performed by: INTERNAL MEDICINE

## 2018-04-19 PROCEDURE — A9567 TECHNETIUM TC-99M AEROSOL: HCPCS

## 2018-04-19 PROCEDURE — 74011250636 HC RX REV CODE- 250/636: Performed by: INTERNAL MEDICINE

## 2018-04-19 PROCEDURE — 74011250636 HC RX REV CODE- 250/636: Performed by: NURSE PRACTITIONER

## 2018-04-19 PROCEDURE — 80048 BASIC METABOLIC PNL TOTAL CA: CPT | Performed by: NURSE PRACTITIONER

## 2018-04-19 PROCEDURE — 85730 THROMBOPLASTIN TIME PARTIAL: CPT | Performed by: EMERGENCY MEDICINE

## 2018-04-19 PROCEDURE — 74011000250 HC RX REV CODE- 250

## 2018-04-19 PROCEDURE — 65660000000 HC RM CCU STEPDOWN

## 2018-04-19 PROCEDURE — 74011250637 HC RX REV CODE- 250/637: Performed by: INTERNAL MEDICINE

## 2018-04-19 PROCEDURE — 74011000258 HC RX REV CODE- 258: Performed by: PHYSICIAN ASSISTANT

## 2018-04-19 PROCEDURE — 77010033678 HC OXYGEN DAILY

## 2018-04-19 PROCEDURE — 36415 COLL VENOUS BLD VENIPUNCTURE: CPT | Performed by: INTERNAL MEDICINE

## 2018-04-19 PROCEDURE — 82550 ASSAY OF CK (CPK): CPT | Performed by: NURSE PRACTITIONER

## 2018-04-19 PROCEDURE — 74011000250 HC RX REV CODE- 250: Performed by: PHYSICIAN ASSISTANT

## 2018-04-19 PROCEDURE — 71045 X-RAY EXAM CHEST 1 VIEW: CPT

## 2018-04-19 RX ORDER — METOPROLOL TARTRATE 5 MG/5ML
INJECTION INTRAVENOUS
Status: COMPLETED
Start: 2018-04-19 | End: 2018-04-19

## 2018-04-19 RX ORDER — ONDANSETRON 4 MG/1
4 TABLET, ORALLY DISINTEGRATING ORAL
Status: DISCONTINUED | OUTPATIENT
Start: 2018-04-19 | End: 2018-04-24 | Stop reason: HOSPADM

## 2018-04-19 RX ORDER — HEPARIN SODIUM 5000 [USP'U]/ML
5000 INJECTION, SOLUTION INTRAVENOUS; SUBCUTANEOUS EVERY 8 HOURS
Status: DISCONTINUED | OUTPATIENT
Start: 2018-04-19 | End: 2018-04-19

## 2018-04-19 RX ORDER — MORPHINE SULFATE 10 MG/ML
2 INJECTION, SOLUTION INTRAMUSCULAR; INTRAVENOUS
Status: DISCONTINUED | OUTPATIENT
Start: 2018-04-19 | End: 2018-04-20 | Stop reason: SDUPTHER

## 2018-04-19 RX ORDER — SODIUM CHLORIDE 9 MG/ML
125 INJECTION, SOLUTION INTRAVENOUS CONTINUOUS
Status: DISPENSED | OUTPATIENT
Start: 2018-04-19 | End: 2018-04-20

## 2018-04-19 RX ORDER — METOPROLOL TARTRATE 5 MG/5ML
5 INJECTION INTRAVENOUS ONCE
Status: COMPLETED | OUTPATIENT
Start: 2018-04-19 | End: 2018-04-19

## 2018-04-19 RX ORDER — METOPROLOL TARTRATE 50 MG/1
50 TABLET ORAL EVERY 12 HOURS
Status: DISCONTINUED | OUTPATIENT
Start: 2018-04-19 | End: 2018-04-20

## 2018-04-19 RX ORDER — ONDANSETRON 2 MG/ML
4 INJECTION INTRAMUSCULAR; INTRAVENOUS
Status: DISCONTINUED | OUTPATIENT
Start: 2018-04-19 | End: 2018-04-24 | Stop reason: HOSPADM

## 2018-04-19 RX ORDER — ACETAMINOPHEN 325 MG/1
650 TABLET ORAL
Status: DISCONTINUED | OUTPATIENT
Start: 2018-04-19 | End: 2018-04-24 | Stop reason: HOSPADM

## 2018-04-19 RX ORDER — DIGOXIN 0.25 MG/ML
250 INJECTION INTRAMUSCULAR; INTRAVENOUS
Status: COMPLETED | OUTPATIENT
Start: 2018-04-19 | End: 2018-04-19

## 2018-04-19 RX ORDER — NALOXONE HYDROCHLORIDE 0.4 MG/ML
0.4 INJECTION, SOLUTION INTRAMUSCULAR; INTRAVENOUS; SUBCUTANEOUS AS NEEDED
Status: DISCONTINUED | OUTPATIENT
Start: 2018-04-19 | End: 2018-04-24 | Stop reason: HOSPADM

## 2018-04-19 RX ORDER — OXYCODONE AND ACETAMINOPHEN 5; 325 MG/1; MG/1
1 TABLET ORAL
Status: DISCONTINUED | OUTPATIENT
Start: 2018-04-19 | End: 2018-04-24 | Stop reason: HOSPADM

## 2018-04-19 RX ADMIN — HEPARIN SODIUM 18 UNITS/KG/HR: 10000 INJECTION, SOLUTION INTRAVENOUS at 11:40

## 2018-04-19 RX ADMIN — MORPHINE SULFATE 2 MG: 10 INJECTION INTRAMUSCULAR; INTRAVENOUS; SUBCUTANEOUS at 06:08

## 2018-04-19 RX ADMIN — SODIUM CHLORIDE 125 ML/HR: 900 INJECTION, SOLUTION INTRAVENOUS at 06:11

## 2018-04-19 RX ADMIN — DIGOXIN 250 MCG: 250 INJECTION, SOLUTION INTRAMUSCULAR; INTRAVENOUS; PARENTERAL at 03:34

## 2018-04-19 RX ADMIN — WARFARIN SODIUM 3 MG: 2 TABLET ORAL at 18:18

## 2018-04-19 RX ADMIN — SODIUM CHLORIDE 10 MG/HR: 900 INJECTION, SOLUTION INTRAVENOUS at 14:09

## 2018-04-19 RX ADMIN — METOPROLOL TARTRATE 50 MG: 50 TABLET ORAL at 20:31

## 2018-04-19 RX ADMIN — METOPROLOL TARTRATE 5 MG: 5 INJECTION INTRAVENOUS at 04:50

## 2018-04-19 RX ADMIN — SODIUM CHLORIDE 20 MG: 9 INJECTION INTRAMUSCULAR; INTRAVENOUS; SUBCUTANEOUS at 09:06

## 2018-04-19 RX ADMIN — HEPARIN SODIUM 18 UNITS/KG/HR: 10000 INJECTION, SOLUTION INTRAVENOUS at 14:11

## 2018-04-19 RX ADMIN — SODIUM CHLORIDE 125 ML/HR: 900 INJECTION, SOLUTION INTRAVENOUS at 22:23

## 2018-04-19 RX ADMIN — SODIUM CHLORIDE 20 MG: 9 INJECTION INTRAMUSCULAR; INTRAVENOUS; SUBCUTANEOUS at 03:34

## 2018-04-19 RX ADMIN — SODIUM CHLORIDE 10 MG/HR: 900 INJECTION, SOLUTION INTRAVENOUS at 10:29

## 2018-04-19 RX ADMIN — ONDANSETRON 4 MG: 2 INJECTION INTRAMUSCULAR; INTRAVENOUS at 06:07

## 2018-04-19 RX ADMIN — DIGOXIN 250 MCG: 250 INJECTION, SOLUTION INTRAMUSCULAR; INTRAVENOUS; PARENTERAL at 09:06

## 2018-04-19 RX ADMIN — METOPROLOL TARTRATE 50 MG: 50 TABLET ORAL at 11:34

## 2018-04-19 NOTE — CONSULTS
[facility]  Pulmonary, Critical Care, and Sleep Medicine    Name: Marissa Justin MRN: [id number]   : 1945 Hospital: [facility]   Date: 2018        Pulmonary Critical Care Initial Patient Consult                                              Reason for CC Consult: Shortness of breath, Atrial fibrillation with RVR    Subjective/History:     Marissa Justin has been seen and evaluated at the request of Dr. Eva Veloz for shortness of breath and Atrial fibrillation with RVR and evaluation for possible PE. Patient is a 71 y.o. male with a history of CHF, AFib, HTN, TIA in  with no residual weakness, and Gout. Patient presented to the ED via EMS with shortness of breath, nausea, and dizziness. Patient [facility] Was followed up with a home visit where he was found to be tachycardic and had an elevated blood pressure. Patient stated that he has had some shortness of breath that has worsened since leaving [facility] as well as nausea and vomiting and he could not keep down his blood pressure and warfarin that he takes for atrial fibrillation. Patient also complains of some mid abdominal tenderness that worsens with palpation. States that he also has had some diarrhea as well as constipation since his surgery. Denies blurred vision, fever chills or night sweats. Denies chest and back pain, and dyspnea. In ED patient being w/u for possible PE. PVL of right leg done and is negative. CTA cannot be due to kidney function, so V/Q scan ordered for tonight. Cardiology consulted by ED. Patient was given Cardizem boluses and started on a amiodarone gtt. [facility] was then consulted to see patient, patient to transfer to ICU.     []The patient is unable to give any meaningful history or review of systems because the patient is:  []Intubated []Sedated   []Unresponsive []Unable to speak     [x]The patient is critically ill on      []Mechanical ventilation []Pressors   []BiPAP [x] Cardioactive gtts            Review of Systems:  Constitutional: negative  Eyes: negative  Ears, nose, mouth, throat, and face: negative  Respiratory: positive for shortness of breath  Cardiovascular: positive for tachypnea  Gastrointestinal: positive for nausea, vomiting and abdominal pain  Genitourinary:negative  Integument/breast: negative  Hematologic/lymphatic: negative  Musculoskeletal:positive for stiff joints and recent knee surgery  Neurological: negative  Behavioral/Psych: negative  Endocrine: negative  Allergic/Immunologic: negative    Past Medical History:  No past medical history on file. Past Surgical History:  No past surgical history on file. Medications:  Prior to Admission medications    Not on File       Current Facility-Administered Medications   Medication Dose Route Frequency    heparin 25,000 units in D5W 250 ml infusion  18-36 Units/kg/hr IntraVENous TITRATE    amiodarone (NEXTERONE) 360 mg in dextrose 200 mL (1.8 mg/mL) infusion  0.5-1 mg/min IntraVENous CONTINUOUS       Allergy:  No Known Allergies     Social History:  Social History   Substance Use Topics    Smoking status: Not on file    Smokeless tobacco: Not on file    Alcohol use Not on file        Family History:  No family history on file.        Objective:   Vital Signs:    Visit Vitals    /76    Pulse (!) 162    Temp 98.3 °F (36.8 °C)    Resp 23    Ht 5' 11\" (1.803 m)    Wt 92.5 kg (204 lb)    SpO2 97%    BMI 28.45 kg/m2               Temp (24hrs), Av.3 °F (36.8 °C), Min:98.3 °F (36.8 °C), Max:98.3 °F (36.8 °C)     Patient Vitals for the past 8 hrs:   Temp Pulse Resp BP SpO2   18 2323 - (!) 162 - 107/76 -   18 2230 - (!) 143 23 (!) 127/92 97 %   18 2215 - (!) 152 23 (!) 125/92 -   18 2200 - (!) 151 25 (!) 109/93 90 %   18 214 - (!) 152 28 124/87 -   18 - (!) 150 24 (!) 116/91 91 %   18 - (!) 156 (!) 33 (!) 125/109 95 %   04/18/18 1957 - (!) 162 - (!) 137/100 -   04/18/18 1932 - (!) 168 23 - 97 %   04/18/18 1930 98.3 °F (36.8 °C) (!) 168 23 (!) 170/148 97 %     Intake/Output:   Last shift:         Last 3 shifts:    No intake or output data in the 24 hours ending 04/19/18 0007    Physical Exam:    Physical Examination: General appearance - oriented to person, place, and time and acyanotic, in no respiratory distress  Mental status - alert, oriented to person, place, and time, affect appropriate to mood  Eyes - pupils equal and reactive, extraocular eye movements intact, sclera anicteric  Mouth - mucous membranes moist, pharynx normal without lesions  Neck - supple, no significant adenopathy  Chest - no tachypnea, retractions or cyanosis, decreased air entry noted in upper left lobe, equal chest excursion.  No wheezing, rales or rhonchi auscultated  Heart - S1 and S2 normal, irregularly irregular rhythm with rate 130s  Abdomen - tenderness noted mid abdomen upon palpation, mild guarding, bowel sounds hypoactive, no organomegaly or masses  Neurological - alert, oriented, normal speech, no focal findings or movement disorder noted  Musculoskeletal - Right knee incision with dressing, otherwise no joint tenderness, deformity or swelling  Extremities - peripheral pulses normal, no pedal edema, no clubbing or cyanosis  Skin - normal coloration and turgor, no rashes, no suspicious skin lesions noted    Data:     Recent Results (from the past 24 hour(s))   EKG, 12 LEAD, INITIAL    Collection Time: 04/18/18  7:39 PM   Result Value Ref Range    Ventricular Rate 158 BPM    Atrial Rate 163 BPM    QRS Duration 78 ms    Q-T Interval 296 ms    QTC Calculation (Bezet) 480 ms    Calculated R Axis -16 degrees    Calculated T Axis 74 degrees    Diagnosis       Atrial fibrillation with rapid ventricular response with premature   ventricular or aberrantly conducted complexes  Abnormal ECG  No previous ECGs available     CBC WITH AUTOMATED DIFF Collection Time: 04/18/18  7:49 PM   Result Value Ref Range    WBC 8.2 4.6 - 13.2 K/uL    RBC 3.69 (L) 4.70 - 5.50 M/uL    HGB 11.3 (L) 13.0 - 16.0 g/dL    HCT 33.6 (L) 36.0 - 48.0 %    MCV 91.1 74.0 - 97.0 FL    MCH 30.6 24.0 - 34.0 PG    MCHC 33.6 31.0 - 37.0 g/dL    RDW 15.5 (H) 11.6 - 14.5 %    PLATELET 221 799 - 755 K/uL    MPV 11.5 9.2 - 11.8 FL    NEUTROPHILS 78 (H) 42 - 75 %    LYMPHOCYTES 14 (L) 20 - 51 %    MONOCYTES 7 2 - 9 %    EOSINOPHILS 1 0 - 5 %    BASOPHILS 0 0 - 3 %    NRBC 6.0 (H) 0  WBC    ABS. NEUTROPHILS 6.4 1.8 - 8.0 K/UL    ABS. LYMPHOCYTES 1.1 0.8 - 3.5 K/UL    ABS. MONOCYTES 0.6 0 - 1.0 K/UL    ABS. EOSINOPHILS 0.1 0.0 - 0.4 K/UL    ABS.  BASOPHILS 0.0 0.0 - 0.06 K/UL    DF MANUAL      PLATELET COMMENTS ADEQUATE PLATELETS      RBC COMMENTS ANISOCYTOSIS  1+        RBC COMMENTS POLYCHROMASIA  1+       METABOLIC PANEL, BASIC    Collection Time: 04/18/18  7:49 PM   Result Value Ref Range    Sodium 133 (L) 136 - 145 mmol/L    Potassium 4.2 3.5 - 5.5 mmol/L    Chloride 98 (L) 100 - 108 mmol/L    CO2 26 21 - 32 mmol/L    Anion gap 9 3.0 - 18 mmol/L    Glucose 150 (H) 74 - 99 mg/dL    BUN 51 (H) 7.0 - 18 MG/DL    Creatinine 3.40 (H) 0.6 - 1.3 MG/DL    BUN/Creatinine ratio 15 12 - 20      GFR est AA 22 (L) >60 ml/min/1.73m2    GFR est non-AA 18 (L) >60 ml/min/1.73m2    Calcium 8.9 8.5 - 10.1 MG/DL   PROTHROMBIN TIME + INR    Collection Time: 04/18/18  7:49 PM   Result Value Ref Range    Prothrombin time 18.6 (H) 11.5 - 15.2 sec    INR 1.6 (H) 0.8 - 1.2     TROPONIN I    Collection Time: 04/18/18  7:49 PM   Result Value Ref Range    Troponin-I, Qt. 0.08 (H) 0.0 - 0.045 NG/ML   PTT    Collection Time: 04/18/18  7:49 PM   Result Value Ref Range    aPTT 34.8 23.0 - 36.4 SEC   EKG, 12 LEAD, SUBSEQUENT    Collection Time: 04/18/18 10:49 PM   Result Value Ref Range    Ventricular Rate 160 BPM    Atrial Rate 174 BPM    QRS Duration 74 ms    Q-T Interval 262 ms    QTC Calculation (Bezet) 427 ms Calculated R Axis -27 degrees    Calculated T Axis 64 degrees    Diagnosis       Atrial fibrillation with rapid ventricular response with premature   ventricular or aberrantly conducted complexes  Abnormal ECG  When compared with ECG of 18-APR-2018 19:39,  No significant change was found     TROPONIN I    Collection Time: 04/18/18 11:12 PM   Result Value Ref Range    Troponin-I, Qt. 0.08 (H) 0.0 - 0.045 NG/ML           No results for input(s): FIO2I, IFO2, HCO3I, IHCO3, HCOPOC, PCO2I, PCOPOC, IPHI, PHI, PHPOC, PO2I, PO2POC in the last 72 hours. No lab exists for component: IPOC2    No results found for: SDES, SREQ, CULT, RPT    Telemetry: AFIB    ECHO 8/31/2013 at [address] AT 50%. NO WALL MOTION ABNORMALITIES. ATRIAL   FIBRILLATION PRESENT. MODERATE DILATED LEFT ATRIUM. REDUCED RIGHT VENTRICULAR GLOBAL SYSTOLIC FUNCTION. TAPSE AT 1.03CM AND TAPSV AT 6.24CM/S. MILD DILATED RIGHT ATRIUM. MODERATE MITRAL REGURGITATION. MITRAL VALVE PRESSURE HALF TIME AT CALCULATED AT 2.0CM2. MODERATE TRICUSPID REGURGITATION. ESTIMATED RIGHT VENTRICULAR SYSTOLIC PRESSURE IS AT   37. 3MMHG. NO MASSES, SHUNTS OR THROMBI SEEN. LAST STUDY ON 4/18/2013, MITRAL VALVE PRESSURE HALF TIME AT CALCULATED AT 2.0CM2. AND WAS AT   2.8CM2. Imaging:  [x]I have personally reviewed the patients chest radiographs images and report     CXR Results  (Last 48 hours)               04/18/18 2045  XR ABD ACUTE W 1 V CHEST Final result    Impression:  IMPRESSION:       1. No acute abdominal findings.       -Enlarged chronic silhouette with suspected perihilar congestion versus   infiltrate. See above. Narrative:  EXAMINATION: Abdomen 2 views, chest single view       INDICATION: Vomiting       COMPARISON: None       FINDINGS:       Frontal view of chest obtained. Post median sternotomy with prosthetic cardiac   valve noted. Moderately enlarged cardiac silhouette.  Prominent perihilar interstitium. No evidence of pneumothorax. Frontal supine and upright views of the abdomen obtained. Nonobstructive bowel   gas pattern. No obvious free air. Right upper quadrant surgical clips. No   suspicious calcifications identified. Overlying wires limits evaluation. Results from [place] encounter on 04/18/18   XR ABD ACUTE W 1 V CHEST   Narrative EXAMINATION: Abdomen 2 views, chest single view    INDICATION: Vomiting    COMPARISON: None    FINDINGS:    Frontal view of chest obtained. Post median sternotomy with prosthetic cardiac  valve noted. Moderately enlarged cardiac silhouette. Prominent perihilar  interstitium. No evidence of pneumothorax. Frontal supine and upright views of the abdomen obtained. Nonobstructive bowel  gas pattern. No obvious free air. Right upper quadrant surgical clips. No  suspicious calcifications identified. Overlying wires limits evaluation. Impression IMPRESSION:    1. No acute abdominal findings.    -Enlarged chronic silhouette with suspected perihilar congestion versus  infiltrate. See above. No results found for this or any previous visit. 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  Underwent mitral valve repair with [place] chordae and a 32 mm mitral ring annuloplasty at [facility] 2002  ·    RECOMMENDATIONS:   Resp: Supplemental O2 via NC, titrate flow for goal SPO2> 90%, pulmonary hygiene care, Aspiration precautions, Keep HOB >30 degrees  ID: No suspicion for infection at this time, continue to monitor  Heme/Onc: Aleukocytosis, H/H, and platelets are stable, trend CBC. CVS: Trend troponin, Continue amiodarone and heparin gtt. Cardiology consulted. No IVF  Renal: Monitor I&Os, Trend Renal indices, may need to consult renal in am if Renal function worsens  GI: SUP, Zofran PRN for N/V, KUB negative   Metabolic: Glycemic control -SSI  Neuro/Sedation/Pain: PRN pain medications, no sedation. Stress ulcer prophylaxis: Pepcid  DVT prophylaxis: Heparin gtt  AM labs: Daily CBC BMP Mag and Phos, PTT in the am.   Diet: Cardiac diet if tolerated  Lines/Devices: PIVs   Further recommendations will be based on the patient's response to recommended treatment and results of the investigation ordered. [x]See my orders for details    My assessment, plan of care, findings, medications, side effects etc were discussed with:  [x]nursing []PT/OT    []respiratory therapy [x]Dr. Ramon Lucero   [x]family [x]Patient     [x]Total critical care time exclusive of procedures 45 minutes with complex decision making performed and > 50% time spent in face to face evaluation.     [facility]an [facility]     Pulmonary, Critical Care Medicine  [facility]

## 2018-04-19 NOTE — PROGRESS NOTES
met with patient and his wife in the Emergency Department, completed the initial Spiritual Assessment of the patient, and offered Pastoral Care, see flow sheets for interventions. Patient was alert and staff was with him.  spoke mostly with wife during visit. She is [place] and her wili is very important to her life. Pastoral support provided. Patient does not have any Baptist/cultural needs that will affect patients preferences in health care. Chart reviewed. Chaplains will continue to follow and will provide pastoral care on an as needed/as requested basis. Louie Roy MDiv.   Board Certified [employer] [phone]

## 2018-04-19 NOTE — ED NOTES
TRANSFER - OUT REPORT:    Verbal report given to DEEPA Hilario (name) on Timo Maria  being transferred to CVT ICU (unit) for routine progression of care       Report consisted of patients Situation, Background, Assessment and   Recommendations(SBAR). Information from the following report(s) SBAR, Kardex, ED Summary, [facility] and Recent Results was reviewed with the receiving nurse. Lines:   Peripheral IV 04/18/18 Left Antecubital (Active)   Site Assessment Clean, dry, & intact 4/18/2018  7:51 PM   Phlebitis Assessment 0 4/18/2018  7:51 PM   Infiltration Assessment 0 4/18/2018  7:51 PM   Dressing Status Clean, dry, & intact 4/18/2018  7:51 PM   Dressing Type Transparent 4/18/2018  7:51 PM       Peripheral IV 04/18/18 Right Hand (Active)   Site Assessment Clean, dry, & intact 4/18/2018 11:00 PM   Phlebitis Assessment 0 4/18/2018 11:00 PM   Infiltration Assessment 0 4/18/2018 11:00 PM   Dressing Status Clean, dry, & intact 4/18/2018 11:00 PM   Dressing Type Transparent 4/18/2018 11:00 PM   Hub Color/Line Status Flushed 4/18/2018 11:00 PM        Opportunity for questions and clarification was provided.       Patient transported with:   Monitor  Registered Nurse

## 2018-04-19 NOTE — ROUTINE PROCESS
Bedside shift change report given to Justo Cortez (oncoming nurse) by Laura Smith RN (offgoing nurse). Report included the following information SBAR, Kardex and Recent Results.

## 2018-04-19 NOTE — PROGRESS NOTES
Report received from Pippa Bell RN from CVT ICU. Pt arrived on unit in no distress with stable vital signs.

## 2018-04-19 NOTE — H&P
History & Physical    Patient: Leno Monet MRN: [id number]  CSN: [id number]    YOB: 1945  Age: 71 y.o. Sex: male      DOA: 4/18/2018    Chief Complaint: No chief complaint on file. HPI:     Leno Monet is a 71 y.o.  male who recently was discharged from [facility] with knee replacement post op developed nausea and vomiting in ability  to keep food down for 3 days  [facility] in [facility] currently no chest pain but complains\t of  Knee pain  In ER received  2 Cardizem bolus and amiodarone with no improvement  In rheart rate heart rate up to 150 with symptoms of dizziness  Past Medical History:   Diagnosis Date    Arthritis     HTN (hypertension)        Past Surgical History:   Procedure Laterality Date    HX KNEE REPLACEMENT  2018       History reviewed. No pertinent family history.     Social History     Social History    Marital status:      Spouse name: N/A    Number of children: N/A    Years of education: N/A     Social History Main Topics    Smoking status: None    Smokeless tobacco: None    Alcohol use None    Drug use: None    Sexual activity: No     Other Topics Concern    None     Social History Narrative    None       Prior to Admission medications    Not on File       No Known Allergies  Recent Results (from the past 24 hour(s))   EKG, 12 LEAD, INITIAL    Collection Time: 04/18/18  7:39 PM   Result Value Ref Range    Ventricular Rate 158 BPM    Atrial Rate 163 BPM    QRS Duration 78 ms    Q-T Interval 296 ms    QTC Calculation (Bezet) 480 ms    Calculated R Axis -16 degrees    Calculated T Axis 74 degrees    Diagnosis       Atrial fibrillation with rapid ventricular response with premature   ventricular or aberrantly conducted complexes  Abnormal ECG  No previous ECGs available     CBC WITH AUTOMATED DIFF    Collection Time: 04/18/18  7:49 PM   Result Value Ref Range    WBC 8.2 4.6 - 13.2 K/uL    RBC 3.69 (L) 4.70 - 5.50 M/uL    HGB 11.3 (L) 13.0 - 16.0 g/dL    HCT 33.6 (L) 36.0 - 48.0 %    MCV 91.1 74.0 - 97.0 FL    MCH 30.6 24.0 - 34.0 PG    MCHC 33.6 31.0 - 37.0 g/dL    RDW 15.5 (H) 11.6 - 14.5 %    PLATELET 211 670 - 676 K/uL    MPV 11.5 9.2 - 11.8 FL    NEUTROPHILS 78 (H) 42 - 75 %    LYMPHOCYTES 14 (L) 20 - 51 %    MONOCYTES 7 2 - 9 %    EOSINOPHILS 1 0 - 5 %    BASOPHILS 0 0 - 3 %    NRBC 6.0 (H) 0  WBC    ABS. NEUTROPHILS 6.4 1.8 - 8.0 K/UL    ABS. LYMPHOCYTES 1.1 0.8 - 3.5 K/UL    ABS. MONOCYTES 0.6 0 - 1.0 K/UL    ABS. EOSINOPHILS 0.1 0.0 - 0.4 K/UL    ABS.  BASOPHILS 0.0 0.0 - 0.06 K/UL    DF MANUAL      PLATELET COMMENTS ADEQUATE PLATELETS      RBC COMMENTS ANISOCYTOSIS  1+        RBC COMMENTS POLYCHROMASIA  1+       METABOLIC PANEL, BASIC    Collection Time: 04/18/18  7:49 PM   Result Value Ref Range    Sodium 133 (L) 136 - 145 mmol/L    Potassium 4.2 3.5 - 5.5 mmol/L    Chloride 98 (L) 100 - 108 mmol/L    CO2 26 21 - 32 mmol/L    Anion gap 9 3.0 - 18 mmol/L    Glucose 150 (H) 74 - 99 mg/dL    BUN 51 (H) 7.0 - 18 MG/DL    Creatinine 3.40 (H) 0.6 - 1.3 MG/DL    BUN/Creatinine ratio 15 12 - 20      GFR est AA 22 (L) >60 ml/min/1.73m2    GFR est non-AA 18 (L) >60 ml/min/1.73m2    Calcium 8.9 8.5 - 10.1 MG/DL   PROTHROMBIN TIME + INR    Collection Time: 04/18/18  7:49 PM   Result Value Ref Range    Prothrombin time 18.6 (H) 11.5 - 15.2 sec    INR 1.6 (H) 0.8 - 1.2     TROPONIN I    Collection Time: 04/18/18  7:49 PM   Result Value Ref Range    Troponin-I, Qt. 0.08 (H) 0.0 - 0.045 NG/ML   PTT    Collection Time: 04/18/18  7:49 PM   Result Value Ref Range    aPTT 34.8 23.0 - 36.4 SEC   EKG, 12 LEAD, SUBSEQUENT    Collection Time: 04/18/18 10:49 PM   Result Value Ref Range    Ventricular Rate 160 BPM    Atrial Rate 174 BPM    QRS Duration 74 ms    Q-T Interval 262 ms    QTC Calculation (Bezet) 427 ms    Calculated R Axis -27 degrees    Calculated T Axis 64 degrees    Diagnosis       Atrial fibrillation with rapid ventricular response with premature   ventricular or aberrantly conducted complexes  Abnormal ECG  When compared with ECG of 2018 19:39,  No significant change was found     TROPONIN I    Collection Time: 18 11:12 PM   Result Value Ref Range    Troponin-I, Qt. 0.08 (H) 0.0 - 0.045 NG/ML       Review of Systems  GENERAL: Patient alert, awake and oriented times 3, able to communicate full sentences and not in distress. HEENT: No change in vision, no earache, tinnitus, sore throat or sinus congestion. NECK: No pain or stiffness. PULMONARY: No shortness of breath, cough or wheeze. Cardiovascular: no pnd or orthopnea, +P  GASTROINTESTINAL: No abdominal pain,+ nausea, +vomiting or diarrhea, melena or bright red blood per rectum. GENITOURINARY: No urinary frequency, urgency, hesitancy or dysuria. MUSCULOSKELETAL: No joint or muscle pain, no back pain, no recent trauma. DERMATOLOGIC: No rash, no itching, no lesions. ENDOCRINE: No polyuria, polydipsia, no heat or cold intolerance. No recent change in weight. HEMATOLOGICAL: No anemia or easy bruising or bleeding. NEUROLOGIC: No headache, seizures, numbness, tingling or weakness. Physical Exam:     Physical Exam:  Visit Vitals    BP (!) 119/94    Pulse (!) 130    Temp 98 °F (36.7 °C)    Resp 25    Ht 5' 11\" (1.803 m)    Wt 92.5 kg (204 lb)    SpO2 98%    BMI 28.45 kg/m2    O2 Flow Rate (L/min): 2 l/min O2 Device: Nasal cannula    Temp (24hrs), Av.2 °F (36.8 °C), Min:98 °F (36.7 °C), Max:98.3 °F (36.8 °C)     1901 -  0700  In: 263 [I.V.:263]  Out: 0         General:  Alert, cooperative, no distress, appears stated age. Head: Normocephalic, without obvious abnormality, atraumatic. Eyes:  Conjunctivae/corneas clear. PERRL, EOMs intact. Nose: Nares normal. No drainage or sinus tenderness. Neck: Supple, symmetrical, trachea midline, no adenopathy, thyroid: no enlargement, no carotid bruit and no JVD.    Lungs: Clear to auscultation bilaterally. Heart:  Regular rate and rhythm, S1, S2 normal.     Abdomen: Soft, non-tender. Bowel sounds normal.    Extremities: Extremities normal, atraumatic, no cyanosis or edema. Pulses: 2+ and symmetric all extremities. Skin:  No rashes or lesions   Neurologic: AAOx3, No focal motor or sensory deficit. Labs Reviewed: All lab results for the last 24 hours reviewed. Recent Results (from the past 24 hour(s))   EKG, 12 LEAD, INITIAL    Collection Time: 04/18/18  7:39 PM   Result Value Ref Range    Ventricular Rate 158 BPM    Atrial Rate 163 BPM    QRS Duration 78 ms    Q-T Interval 296 ms    QTC Calculation (Bezet) 480 ms    Calculated R Axis -16 degrees    Calculated T Axis 74 degrees    Diagnosis       Atrial fibrillation with rapid ventricular response with premature   ventricular or aberrantly conducted complexes  Abnormal ECG  No previous ECGs available     CBC WITH AUTOMATED DIFF    Collection Time: 04/18/18  7:49 PM   Result Value Ref Range    WBC 8.2 4.6 - 13.2 K/uL    RBC 3.69 (L) 4.70 - 5.50 M/uL    HGB 11.3 (L) 13.0 - 16.0 g/dL    HCT 33.6 (L) 36.0 - 48.0 %    MCV 91.1 74.0 - 97.0 FL    MCH 30.6 24.0 - 34.0 PG    MCHC 33.6 31.0 - 37.0 g/dL    RDW 15.5 (H) 11.6 - 14.5 %    PLATELET 370 455 - 361 K/uL    MPV 11.5 9.2 - 11.8 FL    NEUTROPHILS 78 (H) 42 - 75 %    LYMPHOCYTES 14 (L) 20 - 51 %    MONOCYTES 7 2 - 9 %    EOSINOPHILS 1 0 - 5 %    BASOPHILS 0 0 - 3 %    NRBC 6.0 (H) 0  WBC    ABS. NEUTROPHILS 6.4 1.8 - 8.0 K/UL    ABS. LYMPHOCYTES 1.1 0.8 - 3.5 K/UL    ABS. MONOCYTES 0.6 0 - 1.0 K/UL    ABS. EOSINOPHILS 0.1 0.0 - 0.4 K/UL    ABS.  BASOPHILS 0.0 0.0 - 0.06 K/UL    DF MANUAL      PLATELET COMMENTS ADEQUATE PLATELETS      RBC COMMENTS ANISOCYTOSIS  1+        RBC COMMENTS POLYCHROMASIA  1+       METABOLIC PANEL, BASIC    Collection Time: 04/18/18  7:49 PM   Result Value Ref Range    Sodium 133 (L) 136 - 145 mmol/L    Potassium 4.2 3.5 - 5.5 mmol/L Chloride 98 (L) 100 - 108 mmol/L    CO2 26 21 - 32 mmol/L    Anion gap 9 3.0 - 18 mmol/L    Glucose 150 (H) 74 - 99 mg/dL    BUN 51 (H) 7.0 - 18 MG/DL    Creatinine 3.40 (H) 0.6 - 1.3 MG/DL    BUN/Creatinine ratio 15 12 - 20      GFR est AA 22 (L) >60 ml/min/1.73m2    GFR est non-AA 18 (L) >60 ml/min/1.73m2    Calcium 8.9 8.5 - 10.1 MG/DL   PROTHROMBIN TIME + INR    Collection Time: 04/18/18  7:49 PM   Result Value Ref Range    Prothrombin time 18.6 (H) 11.5 - 15.2 sec    INR 1.6 (H) 0.8 - 1.2     TROPONIN I    Collection Time: 04/18/18  7:49 PM   Result Value Ref Range    Troponin-I, Qt. 0.08 (H) 0.0 - 0.045 NG/ML   PTT    Collection Time: 04/18/18  7:49 PM   Result Value Ref Range    aPTT 34.8 23.0 - 36.4 SEC   EKG, 12 LEAD, SUBSEQUENT    Collection Time: 04/18/18 10:49 PM   Result Value Ref Range    Ventricular Rate 160 BPM    Atrial Rate 174 BPM    QRS Duration 74 ms    Q-T Interval 262 ms    QTC Calculation (Bezet) 427 ms    Calculated R Axis -27 degrees    Calculated T Axis 64 degrees    Diagnosis       Atrial fibrillation with rapid ventricular response with premature   ventricular or aberrantly conducted complexes  Abnormal ECG  When compared with ECG of 18-APR-2018 19:39,  No significant change was found     TROPONIN I    Collection Time: 04/18/18 11:12 PM   Result Value Ref Range    Troponin-I, Qt. 0.08 (H) 0.0 - 0.045 NG/ML   and EKG    Procedures/imaging: see electronic medical records for all procedures/Xrays and details which were not copied into this note but were reviewed prior to creation of Plan      Assessment/Plan     Active Problems:    Rapid atrial fibrillation ([address] (4/18/2018)  Continue   amiodorone add lopressor IV and digoxin  Check echo and enzymes  Heparin drip for now    EMMA  Low dose fluids satart    Ileus/  Clears advance as tolerated if nausea gi consult        DVT/GI Prophylaxis: Hep SQ    Discussed with patient at bedside about hospital admission and my plan care, who understood and agree with my plan care.     Nasra Ramey MD  4/19/2018 11:09 PM

## 2018-04-19 NOTE — PROGRESS NOTES
04/19/18 1556   Vitals   Temp 98.3 °F (36.8 °C)   Temp Source Oral   Pulse (Heart Rate) (!) 133   Heart Rate Source Monitor   Resp Rate 18   O2 Sat (%) 96 %   Level of Consciousness Alert   /79   MAP (Calculated) 103   BP 1 Location Right arm   BP 1 Method Automatic   BP Patient Position At rest   MEWS Score 4     MD notified, no new orders given as pt is already being treated. Pt asymptomatic, will continue to monitor.

## 2018-04-19 NOTE — PROGRESS NOTES
SW NOTE:     Reason for Admission:   RAPID ATRIAL FIBRILLATION ACUTE KIDNEY INJURY                   RRAT Score:  5                   Plan for utilizing home health:      PT [address] [facility], [address] (ORDER TO RESUME WILL NEED TO BE WRITTEN)                    Likelihood of Readmission:  GREEN/LOW                         Transition of Care Plan:  PT WILL BE [address] [facility]. PT RESIDES WITH HIS WIFE AND 2 ADULT SON'S AMITA Escalona.[email or web address] AND SHERYL (AGES 23 & 27). PT'S WIFE, ODALYS [phone], HAS A DX OF LATE STAGE PARKINSON DISEASE. PT DENIED USING ANY DME'S PRIOR TO HIS KNEE SURGERY ON April 11TH 2018. PT HAS A WALKER. PT HAD A RIGHT KNEE SURGERY AT [address] [facility] D/C ON SAT. PT REPORTED NOT FEELING GOOD AT D/C. PT REPORTED HAVING A HX OF BLOOD PRESSURE; HOWEVER, IT WAS CONTROLLED. PT PLANS TO HAVE SURGERY ON HIS LEFT KNEE IN 2-3 MONTHS. PT IS UNDER THE CARE OF Dimitri Ortiz AND SAW Eric Omer MD ON April 3RD 2018 TO GET [facility] FOR SURGERY. PT'S SON WILL PROVIDE D/C TRANSPORTATION. Care Management Interventions  PCP Verified by CM: Yes  Last Visit to PCP: 04/03/18  Mode of Transport at Discharge: S  Transition of Care Consult (CM Consult): [address]: No  Reason Outside [facility]: Patient already serviced by other home care/hospice agency  Discharge Durable Medical Equipment: No  Physical Therapy Consult: No  Occupational Therapy Consult: No  Speech Therapy Consult: No  Current Support Network: Lives with Spouse  Confirm Follow Up Transport: Family  Discharge Location  Discharge Placement: Home with home health    SW NOTIFIED [facility] [phone] OF THE PT'S ADMISSION. SW WILL BE AVAILABLE FOR D/C PLANNING AS NEEDED.      NEHA Lewis Chi LSW   [phone] (pager)

## 2018-04-19 NOTE — PROCEDURES
[facility]  *** FINAL REPORT ***    Name: Aure Medina  MRN: [id number]  : 12 Dec 1945  HIS Order #: [id number]  [id number] [facility] Visit #: [id number]  Date: 2018    TYPE OF TEST: Peripheral Venous Testing    REASON FOR TEST  Pain in limb, Limb swelling    Right Leg:-  Deep venous thrombosis:           No  Superficial venous thrombosis:    No  Deep venous insufficiency:        Not examined  Superficial venous insufficiency: Not examined      INTERPRETATION/FINDINGS  Duplex images were obtained using 2-D gray scale, color flow, and  spectral Doppler analysis. Right leg :  1. No evidence of deep venous thrombosis detected in the veins  visualized. 2. Deep vein(s) visualized include the common femoral, femoral,  popliteal, posterior tibial, and peroneal veins. 3. No evidence of superficial thrombosis detected. 4. Superficial vein(s) visualized include the great saphenous vein at  the sapheno-femoral junction. 5. No evidence of deep vein thrombosis in the contralateral common  femoral vein. 6. Pulsatile flow detected. ADDITIONAL COMMENTS    I have personally reviewed the data relevant to the interpretation of  this  study. TECHNOLOGIST: Charlette Thompson.  Duc MONTGOMERY  Signed: 2018 9:39:49 PM    PHYSICIAN: Dori Cantor MD  Signed: 2018 9:19:02 AM

## 2018-04-19 NOTE — PROGRESS NOTES
8839-  Report received from ED RN. Report incomplete/not current and RN was asked to call back with current VS and POC for uncontrolled afib.  0320-  Charge RN called in place of primary RN and wanted to know why I refused to take report. I explained to charge nurse I did not refuse report that I asked for recent VS sooner than 0130 with current afib rate 140-160s. Also to clarify MD was aware with pt current afib rate after amiodarone bolus. Charge RN stated she will follow up. Current VS given over phone and told charge RN the room was ready when they were. 0345-  Pt arrived on floor. An updated report was given by ED RN upon arrival to unit. Gtts confirmed. Orders reviewed. Pt remains in afib 140-160s. Pt placed on 2 L/min NC.  0430-  Hospitalist called and updated on pt HR. Pt stated he has not taken his lopressor in the last 24 hours. 5 mg IV lopressor to be given. MD to come see pt.  0700-  Bedside and Verbal shift change report given to DEEPA Garcia (oncoming nurse). Report included the following information SBAR, Kardex, ED Summary, Intake/Output, MAR, Recent Results and Cardiac Rhythm afib uncontrolled.

## 2018-04-19 NOTE — DISCHARGE INSTRUCTIONS
Atrial Fibrillation: Care Instructions  Your Care Instructions    Atrial fibrillation is an irregular and often fast heartbeat. Treating this condition is important for several reasons. It can cause blood clots, which can travel from your heart to your brain and cause a stroke. If you have a fast heartbeat, you may feel lightheaded, dizzy, and weak. An irregular heartbeat can also increase your risk for heart failure. Atrial fibrillation is often the result of another heart condition, such as high blood pressure or coronary artery disease. Making changes to improve your heart condition will help you stay healthy and active. Follow-up care is a key part of your treatment and safety. Be sure to make and go to all appointments, and call your doctor if you are having problems. It's also a good idea to know your test results and keep a list of the medicines you take. How can you care for yourself at home? Medicines  ? · Take your medicines exactly as prescribed. Call your doctor if you think you are having a problem with your medicine. You will get more details on the specific medicines your doctor prescribes. ? · If your doctor has given you a blood thinner to prevent a stroke, be sure you get instructions about how to take your medicine safely. Blood thinners can cause serious bleeding problems. ? · Do not take any vitamins, over-the-counter drugs, or herbal products without talking to your doctor first.   ? Lifestyle changes  ? · Do not smoke. Smoking can increase your chance of a stroke and heart attack. If you need help quitting, talk to your doctor about stop-smoking programs and medicines. These can increase your chances of quitting for good. ? · Eat a heart-healthy diet. ? · Stay at a healthy weight. Lose weight if you need to.   ? · Limit alcohol to 2 drinks a day for men and 1 drink a day for women. Too much alcohol can cause health problems. ? · Avoid colds and flu.  Get a pneumococcal vaccine shot. If you have had one before, ask your doctor whether you need another dose. Get a flu shot every year. If you must be around people with colds or flu, wash your hands often. Activity  ? · If your doctor recommends it, get more exercise. Walking is a good choice. Bit by bit, increase the amount you walk every day. Try for at least 30 minutes on most days of the week. You also may want to swim, bike, or do other activities. Your doctor may suggest that you join a cardiac rehabilitation program so that you can have help increasing your physical activity safely. ? · Start light exercise if your doctor says it is okay. Even a small amount will help you get stronger, have more energy, and manage stress. Walking is an easy way to get exercise. Start out by walking a little more than you did in the hospital. Gradually increase the amount you walk. ? · When you exercise, watch for signs that your heart is working too hard. You are pushing too hard if you cannot talk while you are exercising. If you become short of breath or dizzy or have chest pain, sit down and rest immediately. ? · Check your pulse regularly. Place two fingers on the artery at the palm side of your wrist, in line with your thumb. If your heartbeat seems uneven or fast, talk to your doctor. When should you call for help? Call 911 anytime you think you may need emergency care. For example, call if:  ? · You have symptoms of a heart attack. These may include:  ¨ Chest pain or pressure, or a strange feeling in the chest.  ¨ Sweating. ¨ Shortness of breath. ¨ Nausea or vomiting. ¨ Pain, pressure, or a strange feeling in the back, neck, jaw, or upper belly or in one or both shoulders or arms. ¨ Lightheadedness or sudden weakness. ¨ A fast or irregular heartbeat. After you call 911, the  may tell you to chew 1 adult-strength or 2 to 4 low-dose aspirin. Wait for an ambulance. Do not try to drive yourself.    ? · You have symptoms of a stroke. These may include:  ¨ Sudden numbness, tingling, weakness, or loss of movement in your face, arm, or leg, especially on only one side of your body. ¨ Sudden vision changes. ¨ Sudden trouble speaking. ¨ Sudden confusion or trouble understanding simple statements. ¨ Sudden problems with walking or balance. ¨ A sudden, severe headache that is different from past headaches. ? · You passed out (lost consciousness). ?Call your doctor now or seek immediate medical care if:  ? · You have new or increased shortness of breath. ? · You feel dizzy or lightheaded, or you feel like you may faint. ? · Your heart rate becomes irregular. ? · You can feel your heart flutter in your chest or skip heartbeats. Tell your doctor if these symptoms are new or worse. ? Watch closely for changes in your health, and be sure to contact your doctor if you have any problems. Where can you learn more? Go to http://vikash-tulio.info/. Enter U020 in the search box to learn more about \"Atrial Fibrillation: Care Instructions. \"  Current as of: September 21, 2016  Content Version: 11.4  © 9288-3191 [employer]. Care instructions adapted under license by [employer] (which disclaims liability or warranty for this information). If you have questions about a medical condition or this instruction, always ask your healthcare professional. [address] any warranty or liability for your use of this information. Learning About Atrial Fibrillation  What is atrial fibrillation? Atrial fibrillation (say \"AY-tree-joão qjr-mjeg-RPA-shun\") is the most common type of irregular heartbeat (arrhythmia). Normally, the heart beats in a strong, steady rhythm. In atrial fibrillation, a problem with the heart's electrical system causes the two upper parts of the heart (the atria) to quiver, or fibrillate.  Your heart rate also may be faster than normal.  Atrial fibrillation can be dangerous because if the heartbeat isn't strong and steady, blood can collect, or pool, in the atria. And pooled blood is more likely to form clots. Clots can travel to the brain, block blood flow, and cause a stroke. Atrial fibrillation can also lead to heart failure. Treatment for atrial fibrillation helps prevent stroke and heart failure. It also helps relieve symptoms. Atrial fibrillation is often caused by another heart problem. It may happen after heart surgery. It may also be caused by other problems, such as an overactive thyroid gland or lung disease. Many people with atrial fibrillation are able to live full and active lives. What are the symptoms? Some people feel symptoms when they have episodes of atrial fibrillation. But other people don't notice any symptoms. If you have symptoms, you may feel:  · A fluttering, racing, or pounding feeling in your chest called palpitations. · Weak or tired. · Dizzy or lightheaded. · Short of breath. · Chest pain. · Confused. You may notice signs of atrial fibrillation when you check your pulse. Your pulse may seem uneven or fast.  What can you expect when you have atrial fibrillation? At first, spells of atrial fibrillation may come on suddenly and last a short time. It may go away on its own or it goes away after treatment. This is called paroxysmal atrial fibrillation. Over time, the spells may last longer and occur more often. They often don't go away on their own. How is it treated? Treatments can help you feel better and prevent future problems, especially stroke and heart failure. The main types of treatment slow the heart rate, control the heart rhythm, and help prevent stroke. Your treatment will depend on the cause of your atrial fibrillation, your symptoms, and your risk for stroke. · Heart rate treatment. Medicine may be used to slow your heart rate. Your heartbeat may still be irregular.  But these medicines keep your heart from beating too fast. They may also help relieve your symptoms. · Heart rhythm treatment. Different treatments may be used to try to stop atrial fibrillation and keep it from returning. They can also relieve symptoms. These treatments include medicine, electrical cardioversion to shock the heart back to a normal rhythm, a procedure called catheter ablation, and heart surgery. · Stroke prevention. You and your doctor can decide how to lower your risk. You may decide to take a blood-thinning medicine, such as aspirin or an anticoagulant. How can you live well with it? You can live well and help manage atrial fibrillation by having a heart-healthy lifestyle. To have a heart-healthy lifestyle:  · Don't smoke. · Eat heart-healthy foods. · Be active. Talk to your doctor about what type and level of exercise is safe for you. · Stay at a healthy weight. Lose weight if you need to. · Manage stress. · Avoid alcohol if it triggers symptoms. · Manage other health problems such as high blood pressure, high cholesterol, and diabetes. · Avoid getting sick from the flu. Get a flu shot every year. Where can you learn more? Go to http://vikash-tulio.info/. Enter [phone] in the search box to learn more about \"Learning About Atrial Fibrillation. \"  Current as of: September 21, 2016  Content Version: 11.4  © 7778-6322 [employer], [employer]. Care instructions adapted under license by [employer] (which disclaims liability or warranty for this information). If you have questions about a medical condition or this instruction, always ask your healthcare professional. [place] [address] any warranty or liability for your use of this information.

## 2018-04-19 NOTE — CONSULTS
[facility] - Consult Note    Consultation request by Alisson Storm MD for advice/opinion related to evaluating Rapid atrial fibrillation (HCC)  Rapid atrial fibrillation ([address]  Atrial fibrillation with rapid ventricular response ([address]  Acute kidney injury [facility])  Knee pain    Date of  Admission: 4/18/2018  7:27 PM   Primary Care Physician:  Magdalena Galicia MD     Assessment:     Patient Active Problem List   Diagnosis Code    Rapid atrial fibrillation ([address] I48.91    Knee pain M25.569    Atrial fibrillation with rapid ventricular response (HCC) I48.91    Acute kidney injury ([address] N17.9       -Persistent atrial fibrillation, presented with RVR, in setting of recent difficulty taking pills due to N/V. Patient taking metoprolol 50 mg bid for rate control as outpatient, warfarin 3 mg daily for [address]. -Hypertension, elevated on admission.  -Nausea and vomiting, persistent over the few days.  -Acute on chronic kidney injury, suspect intravascular volume depletion in setting of N/V, improved with hydration. Creatinine 1.8 4/12/2018.  -Chronic diastolic heart failure. Normal LV function EF 55% on echo 3/2018. On maintenance Bumex 2 mg bid as outpatient. Will need to follow fluid status closely on fluids for EMMA. -Recent right knee surgery. 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 Moderate MR on echo 2013.  -H/o TIA. Primary cardiologist Dr. Wan Sampson. Reviewed records received from their office this am.     Plan:     Would start cardizem drip and stop amiodarone drip. Would continue heparin bridge and starting warfarin 3 mg daily.   Will start home metoprolol dosage for rate control  Hold diuretics with evidence of volume depletion  Will defer nausea and vomiting to primary team.    Stable to transfer to telemetry, no need for ICU or stepdown from a cardiac standpoint. History of Present Illness: This is a 71 y.o. male admitted for Rapid atrial fibrillation (HCC)  Rapid atrial fibrillation (HCC)  Atrial fibrillation with rapid ventricular response (HCC)  Acute kidney injury ([facility] [address]  Knee pain. Patient complains of: N/V, SOB, dizziness. Patient is a 71year old male who presents to [facility] with complaints of N/V for the past few days since discharge after knee replacement. Patient has had increased SOB and complaints of orthopnea. Patient has palpitations and dizziness. Patient is found to be in afib rvr. Patient was hypertensive. Patient was started on cardizem drip and reportedly became hypotensive. Cardizem was stopped and patient was started on amiodarone drip. Patients HR remains 130s. He was treated with IV lopressor and IV dig this AM. Patient is currently comfortable. Patient reports he follows with Yolanda Gore routinely but does not have a good handle on his past medical history of medication list.    Cardiac risk factors:   MV repair  HTN  Diastolic heart failure. Review of Symptoms:  Except as stated above include:  Constitutional: Chills  Respiratory:  Denies Cough  Cardiovascular:  Complains of SOB, orthopnea. Gastrointestinal: Complains of N/V  Genitourinary:  negative  Musculoskeletal:  Negative  Neurological:  Negative  Dermatological:  Negative  Endocrinological: Negative  Psychological:  Negative     Past Medical History:     Past Medical History:   Diagnosis Date    Arthritis     HTN (hypertension)          Social History:     Social History     Social History    Marital status:      Spouse name: N/A    Number of children: N/A    Years of education: N/A     Social History Main Topics    Smoking status: None    Smokeless tobacco: None    Alcohol use None    Drug use: None    Sexual activity: No     Other Topics Concern    None     Social History Narrative    None        Family History:   History reviewed.  No pertinent family history. Medications:   No Known Allergies     Current Facility-Administered Medications   Medication Dose Route Frequency    famotidine (PF) (PEPCID) 20 mg in sodium chloride 0.9% 10 mL injection  20 mg IntraVENous Q12H    ondansetron (ZOFRAN) injection 4 mg  4 mg IntraVENous Q4H PRN    acetaminophen (TYLENOL) tablet 650 mg  650 mg Oral Q4H PRN    oxyCODONE-acetaminophen (PERCOCET) 5-325 mg per tablet 1 Tab  1 Tab Oral Q4H PRN    morphine injection 2 mg  2 mg IntraVENous Q4H PRN    naloxone (NARCAN) injection 0.4 mg  0.4 mg IntraVENous PRN    ondansetron (ZOFRAN ODT) tablet 4 mg  4 mg Oral Q4H PRN    0.9% sodium chloride infusion  125 mL/hr IntraVENous CONTINUOUS    digoxin (LANOXIN) injection 250 mcg  250 mcg IntraVENous NOW    heparin 25,000 units in D5W 250 ml infusion  18-36 Units/kg/hr IntraVENous TITRATE    amiodarone (NEXTERONE) 360 mg in dextrose 200 mL (1.8 mg/mL) infusion  0.5-1 mg/min IntraVENous CONTINUOUS         Physical Exam:     Visit Vitals    BP (!) 113/95    Pulse (!) 136    Temp 98 °F (36.7 °C)    Resp 23    Ht 5' 11\" (1.803 m)    Wt 92.5 kg (204 lb)    SpO2 99%    BMI 28.45 kg/m2     BP Readings from Last 3 Encounters:   04/19/18 (!) 113/95     Pulse Readings from Last 3 Encounters:   04/19/18 (!) 136     Wt Readings from Last 3 Encounters:   04/18/18 92.5 kg (204 lb)       General:  alert, cooperative, no distress, appears stated age  Neck:  no JVD  Lungs:  clear to auscultation bilaterally  Heart:  irregularly irregular rhythm  Abdomen:  abdomen is soft without significant tenderness, masses, organomegaly or guarding  Extremities:  extremities normal, atraumatic, no cyanosis or edema  Skin: Warm and dry.  no hyperpigmentation, vitiligo, or suspicious lesions  Neuro: alert, oriented x3, affect appropriate  Psych: non focal     Data Review:     Recent Labs      04/19/18   0547  04/18/18   1949   WBC  7.9  8.2   HGB  10.1*  11.3*   HCT  30.2*  33.6*   PLT  169  196 Recent Labs      04/19/18   0547  04/18/18 1949   NA  137  133*   K  4.3  4.2   CL  105  98*   CO2  23  26   GLU  129*  150*   BUN  51*  51*   CREA  2.62*  3.40*   CA  7.7*  8.9   PHOS  3.2   --    INR   --   1.6*       Results for orders placed or performed during the hospital encounter of 04/18/18   EKG, 12 LEAD, INITIAL   Result Value Ref Range    Ventricular Rate 158 BPM    Atrial Rate 163 BPM    QRS Duration 78 ms    Q-T Interval 296 ms    QTC Calculation (Bezet) 480 ms    Calculated R Axis -16 degrees    Calculated T Axis 74 degrees    Diagnosis       Atrial fibrillation with rapid ventricular response with premature   ventricular or aberrantly conducted complexes  Abnormal ECG  No previous ECGs available         All Cardiac Markers in the last 24 hours:    Lab Results   Component Value Date/Time     04/19/2018 05:47 AM    CKMB 2.3 04/19/2018 05:47 AM    CKND1 1.9 04/19/2018 05:47 AM    TROIQ 0.04 04/19/2018 05:47 AM    TROIQ 0.08 (H) 04/18/2018 11:12 PM    TROIQ 0.08 (H) 04/18/2018 07:49 PM       Last Lipid:  No results found for: CHOL, CHOLX, CHLST, CHOLV, HDL, LDL, LDLC, DLDLP, TGLX, TRIGL, TRIGP, CHHD, CHHDX    Signed By: MINDY Enamorado     April 19, 2018      Phuong Cramer MD

## 2018-04-20 LAB
ANION GAP SERPL CALC-SCNC: 7 MMOL/L (ref 3–18)
APTT PPP: 105 SEC (ref 23–36.4)
APTT PPP: 79.5 SEC (ref 23–36.4)
BASOPHILS # BLD: 0 K/UL (ref 0–0.06)
BASOPHILS NFR BLD: 0 % (ref 0–3)
BUN SERPL-MCNC: 32 MG/DL (ref 7–18)
BUN/CREAT SERPL: 17 (ref 12–20)
CALCIUM SERPL-MCNC: 8.4 MG/DL (ref 8.5–10.1)
CHLORIDE SERPL-SCNC: 107 MMOL/L (ref 100–108)
CO2 SERPL-SCNC: 23 MMOL/L (ref 21–32)
CREAT SERPL-MCNC: 1.89 MG/DL (ref 0.6–1.3)
DIFFERENTIAL METHOD BLD: ABNORMAL
EOSINOPHIL # BLD: 0.2 K/UL (ref 0–0.4)
EOSINOPHIL NFR BLD: 2 % (ref 0–5)
ERYTHROCYTE [DISTWIDTH] IN BLOOD BY AUTOMATED COUNT: 15.7 % (ref 11.6–14.5)
GLUCOSE SERPL-MCNC: 108 MG/DL (ref 74–99)
HCT VFR BLD AUTO: 29.2 % (ref 36–48)
HGB BLD-MCNC: 9.7 G/DL (ref 13–16)
INR PPP: 1.7 (ref 0.8–1.2)
LYMPHOCYTES # BLD: 0.7 K/UL (ref 0.8–3.5)
LYMPHOCYTES NFR BLD: 9 % (ref 20–51)
MAGNESIUM SERPL-MCNC: 1.9 MG/DL (ref 1.6–2.6)
MCH RBC QN AUTO: 30.8 PG (ref 24–34)
MCHC RBC AUTO-ENTMCNC: 33.2 G/DL (ref 31–37)
MCV RBC AUTO: 92.7 FL (ref 74–97)
MONOCYTES # BLD: 0.5 K/UL (ref 0–1)
MONOCYTES NFR BLD: 6 % (ref 2–9)
NEUTS BAND NFR BLD MANUAL: 2 % (ref 0–5)
NEUTS SEG # BLD: 6.4 K/UL (ref 1.8–8)
NEUTS SEG NFR BLD: 81 % (ref 42–75)
PLATELET # BLD AUTO: 164 K/UL (ref 135–420)
PLATELET COMMENTS,PCOM: ABNORMAL
PMV BLD AUTO: 11 FL (ref 9.2–11.8)
POTASSIUM SERPL-SCNC: 4.2 MMOL/L (ref 3.5–5.5)
PROTHROMBIN TIME: 18.9 SEC (ref 11.5–15.2)
RBC # BLD AUTO: 3.15 M/UL (ref 4.7–5.5)
RBC MORPH BLD: ABNORMAL
SODIUM SERPL-SCNC: 137 MMOL/L (ref 136–145)
WBC # BLD AUTO: 7.8 K/UL (ref 4.6–13.2)

## 2018-04-20 PROCEDURE — 74011250637 HC RX REV CODE- 250/637: Performed by: INTERNAL MEDICINE

## 2018-04-20 PROCEDURE — 74011000250 HC RX REV CODE- 250: Performed by: NURSE PRACTITIONER

## 2018-04-20 PROCEDURE — 74011636637 HC RX REV CODE- 636/637: Performed by: INTERNAL MEDICINE

## 2018-04-20 PROCEDURE — 74011000258 HC RX REV CODE- 258: Performed by: PHYSICIAN ASSISTANT

## 2018-04-20 PROCEDURE — 85730 THROMBOPLASTIN TIME PARTIAL: CPT | Performed by: NURSE PRACTITIONER

## 2018-04-20 PROCEDURE — 74011000250 HC RX REV CODE- 250: Performed by: PHYSICIAN ASSISTANT

## 2018-04-20 PROCEDURE — 80048 BASIC METABOLIC PNL TOTAL CA: CPT | Performed by: NURSE PRACTITIONER

## 2018-04-20 PROCEDURE — 97530 THERAPEUTIC ACTIVITIES: CPT

## 2018-04-20 PROCEDURE — 94640 AIRWAY INHALATION TREATMENT: CPT

## 2018-04-20 PROCEDURE — 83735 ASSAY OF MAGNESIUM: CPT | Performed by: NURSE PRACTITIONER

## 2018-04-20 PROCEDURE — 85610 PROTHROMBIN TIME: CPT | Performed by: NURSE PRACTITIONER

## 2018-04-20 PROCEDURE — 85730 THROMBOPLASTIN TIME PARTIAL: CPT | Performed by: EMERGENCY MEDICINE

## 2018-04-20 PROCEDURE — 97161 PT EVAL LOW COMPLEX 20 MIN: CPT

## 2018-04-20 PROCEDURE — 74011250636 HC RX REV CODE- 250/636: Performed by: EMERGENCY MEDICINE

## 2018-04-20 PROCEDURE — 74011000250 HC RX REV CODE- 250: Performed by: INTERNAL MEDICINE

## 2018-04-20 PROCEDURE — 74011250637 HC RX REV CODE- 250/637: Performed by: EMERGENCY MEDICINE

## 2018-04-20 PROCEDURE — 65660000000 HC RM CCU STEPDOWN

## 2018-04-20 PROCEDURE — 74011250636 HC RX REV CODE- 250/636: Performed by: INTERNAL MEDICINE

## 2018-04-20 PROCEDURE — 85025 COMPLETE CBC W/AUTO DIFF WBC: CPT | Performed by: NURSE PRACTITIONER

## 2018-04-20 PROCEDURE — 74011250637 HC RX REV CODE- 250/637: Performed by: PHYSICIAN ASSISTANT

## 2018-04-20 PROCEDURE — 77030013140 HC MSK NEB VYRM -A

## 2018-04-20 PROCEDURE — 36415 COLL VENOUS BLD VENIPUNCTURE: CPT | Performed by: NURSE PRACTITIONER

## 2018-04-20 RX ORDER — METOPROLOL TARTRATE 25 MG/1
25 TABLET, FILM COATED ORAL ONCE
Status: COMPLETED | OUTPATIENT
Start: 2018-04-20 | End: 2018-04-20

## 2018-04-20 RX ORDER — WARFARIN SODIUM 5 MG/1
5 TABLET ORAL ONCE
Status: COMPLETED | OUTPATIENT
Start: 2018-04-20 | End: 2018-04-20

## 2018-04-20 RX ORDER — IPRATROPIUM BROMIDE 0.5 MG/2.5ML
0.5 SOLUTION RESPIRATORY (INHALATION)
Status: DISCONTINUED | OUTPATIENT
Start: 2018-04-20 | End: 2018-04-22

## 2018-04-20 RX ORDER — BUMETANIDE 1 MG/1
1 TABLET ORAL 2 TIMES DAILY
Status: DISCONTINUED | OUTPATIENT
Start: 2018-04-20 | End: 2018-04-24 | Stop reason: HOSPADM

## 2018-04-20 RX ORDER — BUMETANIDE 0.25 MG/ML
1 INJECTION INTRAMUSCULAR; INTRAVENOUS ONCE
Status: COMPLETED | OUTPATIENT
Start: 2018-04-20 | End: 2018-04-20

## 2018-04-20 RX ORDER — BUDESONIDE 0.5 MG/2ML
500 INHALANT ORAL
Status: DISCONTINUED | OUTPATIENT
Start: 2018-04-20 | End: 2018-04-22

## 2018-04-20 RX ORDER — MORPHINE SULFATE 2 MG/ML
2 INJECTION, SOLUTION INTRAMUSCULAR; INTRAVENOUS
Status: DISCONTINUED | OUTPATIENT
Start: 2018-04-20 | End: 2018-04-24 | Stop reason: HOSPADM

## 2018-04-20 RX ORDER — PREDNISONE 20 MG/1
40 TABLET ORAL
Status: DISCONTINUED | OUTPATIENT
Start: 2018-04-20 | End: 2018-04-23

## 2018-04-20 RX ADMIN — PREDNISONE 40 MG: 20 TABLET ORAL at 08:41

## 2018-04-20 RX ADMIN — SODIUM CHLORIDE 20 MG: 9 INJECTION INTRAMUSCULAR; INTRAVENOUS; SUBCUTANEOUS at 08:41

## 2018-04-20 RX ADMIN — WARFARIN SODIUM 5 MG: 5 TABLET ORAL at 18:17

## 2018-04-20 RX ADMIN — IPRATROPIUM BROMIDE 0.5 MG: 0.5 SOLUTION RESPIRATORY (INHALATION) at 14:00

## 2018-04-20 RX ADMIN — HEPARIN SODIUM 18 UNITS/KG/HR: 10000 INJECTION, SOLUTION INTRAVENOUS at 08:24

## 2018-04-20 RX ADMIN — SODIUM CHLORIDE 10 MG/HR: 900 INJECTION, SOLUTION INTRAVENOUS at 08:24

## 2018-04-20 RX ADMIN — BUMETANIDE 1 MG: 0.25 INJECTION INTRAMUSCULAR; INTRAVENOUS at 10:02

## 2018-04-20 RX ADMIN — METOPROLOL TARTRATE 75 MG: 50 TABLET ORAL at 21:50

## 2018-04-20 RX ADMIN — BUMETANIDE 1 MG: 1 TABLET ORAL at 18:17

## 2018-04-20 RX ADMIN — IPRATROPIUM BROMIDE 0.5 MG: 0.5 SOLUTION RESPIRATORY (INHALATION) at 20:38

## 2018-04-20 RX ADMIN — SODIUM CHLORIDE 10 MG/HR: 900 INJECTION, SOLUTION INTRAVENOUS at 02:36

## 2018-04-20 RX ADMIN — METOPROLOL TARTRATE 25 MG: 25 TABLET ORAL at 10:02

## 2018-04-20 RX ADMIN — BUDESONIDE 500 MCG: 0.5 INHALANT RESPIRATORY (INHALATION) at 20:38

## 2018-04-20 RX ADMIN — Medication 2 MG: at 15:57

## 2018-04-20 RX ADMIN — METOPROLOL TARTRATE 50 MG: 50 TABLET ORAL at 08:24

## 2018-04-20 RX ADMIN — BUDESONIDE 500 MCG: 0.5 INHALANT RESPIRATORY (INHALATION) at 10:01

## 2018-04-20 RX ADMIN — HEPARIN SODIUM 18 UNITS/KG/HR: 10000 INJECTION, SOLUTION INTRAVENOUS at 07:36

## 2018-04-20 RX ADMIN — IPRATROPIUM BROMIDE 0.5 MG: 0.5 SOLUTION RESPIRATORY (INHALATION) at 10:01

## 2018-04-20 NOTE — PROGRESS NOTES
[facility]  Progress Note    Patient: Jaison Baeza Age: 71 y.o. : 1945 MR#: [id number] SSN: [id number]  Date: 2018     Subjective:      Patient seen and evaluated on 18.   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  3- Recent R TKR  4- h/o systolic chf, JQ-96%, compenstaed  5- h/o MVR    PLAN  On cardizem, BB, heparin drip while INR subtherapeutic  Coumadin  Monitor renal function  Bumex resumed by Cardiology  Advanced care planning: full code  PT, OT  D/w patient    Case discussed with:  []Patient  []Family  []Nursing  []Case Management  DVT Prophylaxis:  []Lovenox  []Hep SQ  []SCDs  []Coumadin   [x]On Heparin gtt    Objective:   VS:   Visit Vitals    /88 (BP 1 Location: Right arm, BP Patient Position: At rest)    Pulse (!) 102    Temp 98.2 °F (36.8 °C)    Resp 20    Ht 5' 11\" (1.803 m)    Wt 99.7 kg (219 lb 14.4 oz)    SpO2 94%    BMI 30.67 kg/m2      Tmax/24hrs: Temp (24hrs), Av.3 °F (36.8 °C), Min:98 °F (36.7 °C), Max:98.6 °F (37 °C)    Intake/Output Summary (Last 24 hours) at 18 1241  Last data filed at 18 1130   Gross per 24 hour   Intake           1840.4 ml   Output             2400 ml   Net           -559.6 ml       General:  Awake, alert  Cardiovascular:  S1S2+, RRR  Pulmonary:  CTA b/l  GI:  Soft, BS+, NT, ND  Extremities:  trace edema      Labs:    Recent Results (from the past 24 hour(s))   CARDIAC PANEL,(CK, CKMB & TROPONIN)    Collection Time: 18  5:00 PM   Result Value Ref Range     39 - 308 U/L    CK - MB 2.7 <3.6 ng/ml    CK-MB Index 2.0 0.0 - 4.0 %    Troponin-I, Qt. 0.04 0.0 - 0.045 NG/ML   CBC WITH AUTOMATED DIFF    Collection Time: 18  2:50 AM   Result Value Ref Range    WBC 7.8 4.6 - 13.2 K/uL    RBC 3.15 (L) 4.70 - 5.50 M/uL    HGB 9.7 (L) 13.0 - 16.0 g/dL    HCT 29.2 (L) 36.0 - 48.0 %    MCV 92.7 74.0 - 97.0 FL    MCH 30.8 24.0 - 34.0 PG    MCHC 33.2 31.0 - 37.0 g/dL    RDW 15.7 (H) 11.6 - 14.5 %    PLATELET 169 713 - 786 K/uL    MPV 11.0 9.2 - 11.8 FL    NEUTROPHILS 81 (H) 42 - 75 %    BAND NEUTROPHILS 2 0 - 5 %    LYMPHOCYTES 9 (L) 20 - 51 %    MONOCYTES 6 2 - 9 %    EOSINOPHILS 2 0 - 5 %    BASOPHILS 0 0 - 3 %    ABS. NEUTROPHILS 6.4 1.8 - 8.0 K/UL    ABS. LYMPHOCYTES 0.7 (L) 0.8 - 3.5 K/UL    ABS. MONOCYTES 0.5 0 - 1.0 K/UL    ABS. EOSINOPHILS 0.2 0.0 - 0.4 K/UL    ABS.  BASOPHILS 0.0 0.0 - 0.06 K/UL    DF MANUAL      PLATELET COMMENTS ADEQUATE PLATELETS      RBC COMMENTS ANISOCYTOSIS  2+        RBC COMMENTS POLYCHROMASIA  1+        RBC COMMENTS OVALOCYTES  2+       METABOLIC PANEL, BASIC    Collection Time: 04/20/18  2:50 AM   Result Value Ref Range    Sodium 137 136 - 145 mmol/L    Potassium 4.2 3.5 - 5.5 mmol/L    Chloride 107 100 - 108 mmol/L    CO2 23 21 - 32 mmol/L    Anion gap 7 3.0 - 18 mmol/L    Glucose 108 (H) 74 - 99 mg/dL    BUN 32 (H) 7.0 - 18 MG/DL    Creatinine 1.89 (H) 0.6 - 1.3 MG/DL    BUN/Creatinine ratio 17 12 - 20      GFR est AA 43 (L) >60 ml/min/1.73m2    GFR est non-AA 35 (L) >60 ml/min/1.73m2    Calcium 8.4 (L) 8.5 - 10.1 MG/DL   MAGNESIUM    Collection Time: 04/20/18  2:50 AM   Result Value Ref Range    Magnesium 1.9 1.6 - 2.6 mg/dL   PROTHROMBIN TIME + INR    Collection Time: 04/20/18  2:50 AM   Result Value Ref Range    Prothrombin time 18.9 (H) 11.5 - 15.2 sec    INR 1.7 (H) 0.8 - 1.2     PTT    Collection Time: 04/20/18  2:50 AM   Result Value Ref Range    aPTT 105.0 (H) 23.0 - 36.4 SEC   PTT    Collection Time: 04/20/18 10:42 AM   Result Value Ref Range    aPTT 79.5 (H) 23.0 - 36.4 SEC       Signed By: Antionette Katz MD     April 20, 2018

## 2018-04-20 NOTE — ROUTINE PROCESS
Bedside and Verbal shift change report given to Danielle Callahan RN (oncoming nurse) by Vonnie García RN (offgoing nurse). Report included the following information SBAR, Kardex, MAR and Recent Results.     SITUATION:    Code Status: Full Code   Reason for Admission: Rapid atrial fibrillation (HCC)   Rapid atrial fibrillation (HCC)   Atrial fibrillation with rapid ventricular response (HCC)   Acute kidney [facility] ([facility]   Knee pain    [facility] day: 1   Problem List:       Hospital Problems  Date Reviewed: 4/19/2018          Codes Class Noted POA    Knee pain ICD-10-CM: M25.569  ICD-9-CM: 719.46  4/19/2018 Unknown        Atrial fibrillation with rapid ventricular response ([facility] [address] ICD-10-CM: I48.91  ICD-9-CM: 427.31  4/19/2018 Unknown        Acute kidney injury ([facility] [facility] ICD-10-CM: N17.9  ICD-9-CM: 584.9  4/19/2018 Unknown        * (Principal)Rapid atrial fibrillation (HCC) ICD-10-CM: I48.91  ICD-9-CM: 427.31  4/18/2018 Unknown              BACKGROUND:    Past Medical History:   Past Medical History:   Diagnosis Date    Arthritis     HTN (hypertension)          Patient taking anticoagulants yes     ASSESSMENT:    Changes in Assessment Throughout Shift:  none     Patient has Central Line: no Reasons if yes: na   Patient has Arshad Cath: no Reasons if yes: na      Last Vitals:     Vitals:    04/20/18 0005 04/20/18 0316 04/20/18 0431 04/20/18 0735   BP: (!) 143/103 131/88  (!) 148/91   Pulse: 90 (!) 117  (!) 112   Resp: 20 20 20   Temp: 98 °F (36.7 °C) 98.6 °F (37 °C)  98.3 °F (36.8 °C)   SpO2: 97% 100%  97%   Weight:   99.7 kg (219 lb 14.4 oz)    Height:            IV and DRAINS (will only show if present)   Peripheral IV 04/18/18 Right Hand-Site Assessment: Clean, dry, & intact  Peripheral IV 04/19/18 Right Forearm-Site Assessment: Clean, dry, & intact  Peripheral IV 04/18/18 Left Antecubital-Site Assessment: Clean, dry, & intact     WOUND (if present)   Wound Type:  none   Dressing present Dressing Present : No   Wound Concerns/Notes:  none     PAIN    Pain Assessment    Pain Intensity 1: 0 (04/20/18 0316)    Pain Location 1: Knee    Pain Intervention(s) 1: MD Breezy notified (comment) (MD to be notified)    Patient Stated Pain Goal: 0  o Interventions for Pain:  none  o Intervention effective: no and denies pain  o Time of last intervention:  See Mar   o Reassessment Completed: yes      Last 3 Weights:  Last 3 Recorded Weights in this Encounter    04/18/18 1930 04/20/18 0431   Weight: 92.5 kg (204 lb) 99.7 kg (219 lb 14.4 oz)     Weight change: 7.212 kg (15 lb 14.4 oz)     INTAKE/OUPUT    Current Shift: 04/20 0701 - 04/20 1900  In: -   Out: 600 [Urine:600]    Last three shifts: 04/18 1901 - 04/20 0700  In: 2103.4 [P.O.:320; I.V.:1783.4]  Out: 1300 [Urine:1300]     LAB RESULTS     Recent Labs      04/20/18   0250  04/19/18   0547  04/18/18 1949   WBC  7.8  7.9  8.2   HGB  9.7*  10.1*  11.3*   HCT  29.2*  30.2*  33.6*   PLT  164  169  196        Recent Labs      04/20/18   0250  04/19/18   0547  04/18/18 1949   NA  137  137  133*   K  4.2  4.3  4.2   GLU  108*  129*  150*   BUN  32*  51*  51*   CREA  1.89*  2.62*  3.40*   CA  8.4*  7.7*  8.9   MG  1.9   --    --    INR  1.7*  1.7*  1.6*       RECOMMENDATIONS AND DISCHARGE PLANNING     1. Pending tests/procedures/ Plan of Care or Other Needs: blood transfusion and labs     2. Discharge plan for patient and Needs/Barriers: pending    3. Estimated Discharge Date: pending Posted on Whiteboard in Patients Room: yes      4. The patient's care plan was reviewed with the oncoming nurse. \"HEALS\" SAFETY CHECK      Fall Risk    Total Score: 3    Safety Measures: Safety Measures: Bed/Chair alarm on, Bed/Chair-Wheels locked, Bed in low position, Call light within reach, Fall prevention (comment), Side rails X 3    A safety check occurred in the patient's room between off going nurse and oncoming nurse listed above.     The safety check included the below items  Area Items H  High Alert Medications - Verify all high alert medication drips (heparin, PCA, etc.)   E  Equipment - Suction is set up for ALL patients (with triny)  - Red plugs utilized for all equipment (IV pumps, etc.)  - WOWs wiped down at end of shift.  - Room stocked with oxygen, suction, and other unit-specific supplies   A  Alarms - Bed alarm is set for fall risk patients  - Ensure chair alarm is in place and activated if patient is up in a chair   L  Lines - Check IV for any infiltration  - Arshad bag is empty if patient has a Arshad   - Tubing and IV bags are labeled   S  Safety   - Room is clean, patient is clean, and equipment is clean. - Hallways are clear from equipment besides carts. - Fall bracelet on for fall risk patients  - Ensure room is clear and free of clutter  - Suction is set up for ALL patients (with triny)  - Hallways are clear from equipment besides carts.    - Isolation precautions followed, supplies available outside room, sign posted     Eric Domínguez RN

## 2018-04-20 NOTE — PROGRESS NOTES
04/20/18 0735   Vitals   Temp 98.3 °F (36.8 °C)   Temp Source Oral   Pulse (Heart Rate) (!) 112   Heart Rate Source Monitor   Resp Rate 20   O2 Sat (%) 97 %   Level of Consciousness Alert   BP (!) 148/91  (Nurse andrei was notified)   MAP (Calculated) 110   BP 1 Location Right arm   BP 1 Method Automatic   BP Patient Position At rest   MEWS Score 3     Pt asymptomatic and resting quietly. Notified Marcela Graff, ordered to give scheduled metoprolol 50 mg early. Will continue to monitor pt.

## 2018-04-20 NOTE — PROGRESS NOTES
Problem: Mobility Impaired (Adult and Pediatric)  Goal: *Acute Goals and Plan of Care (Insert Text)  Physical Therapy Goals  Initiated 4/20/2018 and to be accomplished within 7 day(s)  1. Patient will move from supine to sit and sit to supine , scoot up and down and roll side to side in bed with supervision/set-up. 2.  Patient will transfer from bed to chair and chair to bed with supervision/set-up using the least restrictive device. 3.  Patient will perform sit to stand with supervision/set-up. 4.  Patient will ambulate with supervision/set-up for >150 feet with the least restrictive device. 5.  Patient will ascend/descend 4 stairs with 1 handrail(s) with supervision/set-up. physical Therapy EVALUATION    Patient: Daniela Turner (77 y.o. male)  Date: 4/20/2018  Primary Diagnosis: Rapid atrial fibrillation (HCC)  Rapid atrial fibrillation (HCC)  Atrial fibrillation with rapid ventricular response (HCC)  Acute kidney injury [facility])  Knee pain        Precautions: Fall     ASSESSMENT :  Patient is 70 yo M admitted to hospital for rapid a-fib with tachycardia and recently underwent R TKA on 4/11/18 and presents today alert and agreeable to therapy. Patient was educated on role of therapy, reviewed TE (see below), and equipment in room including role of SCDs and towel roll. Patient transferred to sitting EOB for objective assessment and was given demo with instruction on sit <> stand transfer and gait training. HR at rest was 112bpm and immediately upon standing was 157bpm with 10sec to recover to 120bpm. Patient transferred to standing with Supervision and after brief rest to allow HR recovery, ambulated 5ft to locked reclienr. At conclusion of session patient transferred to sitting in recliner and was left resting with call bell by the side and towel roll under ankle. Patient instructed to call for assistance if they needed to get up for any reason and denied need for further assistance.  Educated on energy conservation and activity pacing and encouraged to get to recliner for several hours per day and for meals with assist; acknowledged understanding. Patient demonstrates decreased strength, mobility, and endurance and will benefit from skilled intervention to address the above impairments. Patients rehabilitation potential is considered to be Good  Factors which may influence rehabilitation potential include:   []         None noted  []         Mental ability/status  [x]         Medical condition  [x]         Home/family situation and support systems  [x]         Safety awareness  [x]         Pain tolerance/management  []         Other:      PLAN :  Recommendations and Planned Interventions:  [x]           Bed Mobility Training             [x]    Neuromuscular Re-Education  [x]           Transfer Training                   []    Orthotic/Prosthetic Training  [x]           Gait Training                          []    Modalities  [x]           Therapeutic Exercises          []    Edema Management/Control  [x]           Therapeutic Activities            [x]    Patient and Family Training/Education  []           Other (comment):    Frequency/Duration: Patient will be followed by physical therapy 1-2 times per day/4-7 days per week to address goals. Discharge Recommendations: Home Health  Further Equipment Recommendations for Discharge: rolling walker     G-CODES     Mobility  Current  CI= 1-19%   Goal  CI= 1-19%.   The severity rating is based on the Level of Assistance required for Functional Mobility and ADLs.        G-CODES     Eval Complexity: History: MEDIUM  Complexity : 1-2 comorbidities / personal factors will impact the outcome/ POC Exam:LOW Complexity : 1-2 Standardized tests and measures addressing body structure, function, activity limitation and / or participation in recreation  Presentation: LOW Complexity : Stable, uncomplicated  Clinical Decision Making:Low Complexity   Overall Complexity:LOW SUBJECTIVE:   Patient stated I've been keeping up with my exercises.     OBJECTIVE DATA SUMMARY:     Past Medical History:   Diagnosis Date    Arthritis     HTN (hypertension)      Past Surgical History:   Procedure Laterality Date    HX KNEE REPLACEMENT  2018     Barriers to Learning/Limitations: None  Compensate with: N/A  Prior Level of Function/Home Situation: Patient lives with wife in 1 story home with 4STE and patient was using RW for mobility and receiving  PT as he underwent R TKA on 4/11/18. Patient has walk in shower and RW, no other AD/DME. Critical Behavior:    A&Ox4  Strength:    Strength: Generally decreased, functional (R knee ext/flex 4-/5, DF 5/5, LLE 5/5)   Tone & Sensation:   Tone: Normal (BLE)   Sensation: Intact (BLE)   Range Of Motion:  AROM: Generally decreased, functional (R knee 0-90 degrees)   Functional Mobility:  Bed Mobility:   Supine to Sit: Stand-by assistance   Scooting: Supervision  Transfers:  Sit to Stand: Supervision  Stand to Sit: Supervision    Balance:   Sitting: Intact  Standing: Impaired; With support  Standing - Static: Fair  Standing - Dynamic : Fair  Ambulation/Gait Training:  Distance (ft): 5 Feet (ft)  Assistive Device: Walker, rolling  Ambulation - Level of Assistance: Contact guard assistance    Speed/Dorothy: Slow  Interventions: Verbal cues; Visual/Demos   Therapeutic Exercises:   Reviewed TE including AP's, heel slides, and quad set with 3sec hold. Pain:  Pt reports 0/10 pain or discomfort prior to treatment.    Pt reports 0/10 pain or discomfort post treatment. Activity Tolerance:   Session limited by tachycardia with RHR 113bpm and HR reaching 157bpm immediately upon standing with recover within 10sec to 120bpm.   Please refer to the flowsheet for vital signs taken during this treatment.   After treatment:   [x]         Patient left in no apparent distress sitting up in chair  []         Patient left in no apparent distress in bed  [x] Call bell left within reach  [x]         Nursing notified  []         Caregiver present  []         Bed alarm activated  []         SCDs in place to B LE     COMMUNICATION/EDUCATION:   [x]         Fall prevention education was provided and the patient/caregiver indicated understanding. [x]         Patient/family have participated as able in goal setting and plan of care. [x]         Patient/family agree to work toward stated goals and plan of care. []         Patient understands intent and goals of therapy, but is neutral about his/her participation. []         Patient is unable to participate in goal setting and plan of care.     Thank you for this referral.  Denita Cordon, PT   Time Calculation: 24 mins

## 2018-04-20 NOTE — PROGRESS NOTES
NUTRITION    Nursing Referral: [facility]  Nutrition Consult: General Nutrition Management & Supplements     RECOMMENDATIONS / PLAN:     - Add supplements: Ensure Enlive, once daily.  - Monitor meal intake and diet tolerance. - Continue RD inpatient monitoring and evaluation. NUTRITION INTERVENTIONS & DIAGNOSIS:     [x] Meals/snacks: modify composition  [x] Medical food supplement therapy: initiate  [x] Nutrition Education: warfarin dietary interactions provided 4/20     Nutrition Diagnosis: Predicted sub-optimal energy intake related to appetite as evidenced by pt report of sometimes skipping meals. ASSESSMENT:     Pt with nausea/vomiting and inability to keep food down x 3 days PTA. Currently tolerating diet with good meal intake and appetite. Pt sometimes skips breakfast, agreeable to consume supplement in place of skipped meals. Drinks Ensure at home and states it is expensive, provided coupons. Average po intake adequate to meet patients estimated nutritional needs:   [] Yes     [] No   [x] Unable to determine at this time    Diet: DIET CARDIAC Regular      Food Allergies: NKFA  Current Appetite:   [x] Good     [] Fair     [] Poor     [] Other:  Appetite/meal intake prior to admission:   [] Good     [] Fair     [x] Poor x 3 days   [] Other:  Feeding Limitations:  [] Swallowing difficulty    [] Chewing difficulty    [] Other:  Current Meal Intake: No data found.       BM: 4/19   Skin Integrity: Surgical incision to right knee  Edema: None  Pertinent Medications: Reviewed: zofran, steroid    Recent Labs      04/20/18   0250  04/19/18   0547  04/18/18   1949   NA  137  137  133*   K  4.2  4.3  4.2   CL  107  105  98*   CO2  23  23  26   GLU  108*  129*  150*   BUN  32*  51*  51*   CREA  1.89*  2.62*  3.40*   CA  8.4*  7.7*  8.9   MG  1.9   --    --    PHOS   --   3.2   --        Intake/Output Summary (Last 24 hours) at 04/20/18 1209  Last data filed at 04/20/18 1130   Gross per 24 hour   Intake 1840.4 ml   Output             2400 ml   Net           -559.6 ml       Anthropometrics:  Ht Readings from Last 1 Encounters:   04/18/18 5' 11\" (1.803 m)     Last 3 Recorded Weights in this Encounter    04/18/18 1930 04/20/18 0431   Weight: 92.5 kg (204 lb) 99.7 kg (219 lb 14.4 oz)     Body mass index is 30.67 kg/(m^2). Obese Class I       Weight History: Pt reports stable weight hx PTA. Weight Metrics 4/20/2018   Weight 219 lb 14.4 oz   BMI 30.67 kg/m2        Admitting Diagnosis: Rapid atrial fibrillation (HCC)  Rapid atrial fibrillation (HCC)  Atrial fibrillation with rapid ventricular response (HCC)  Acute kidney injury ([facility] [address]  Knee pain  Pertinent PMHx: HTN    Education Needs:        [] None identified  [] Identified - Not appropriate at this time  [x]  Identified and addressed - refer to education log (warfarin dietary interactions)  Learning Limitations:   [x] None identified  [] Identified    Cultural, Druze & ethnic food preferences:  [x] None identified    [] Identified and addressed     ESTIMATED NUTRITION NEEDS:     Calories: 8661-9540 kcal (MSJx1.2-1.3) based on  [x] Actual BW: 100 kg      [] IBW   Protein:  gm (0.8-1 gm/kg) based on  [x] Actual BW      [] IBW   Fluid: 1 mL/kcal     MONITORING & EVALUATION:     Nutrition Goal(s):   1. Po intake of meals will meet >75% of patient estimated nutritional needs within the next 7 days.   Outcome:  [] Met/Ongoing    []  Not Met    [x] New/Initial Goal     Monitoring:   [x] Food and beverage intake   [x] Diet order   [x] Nutrition-focused physical findings   [x] Treatment/therapy   [] Weight   [] Enteral nutrition intake        Previous Recommendations (for follow-up assessments only):     []   Implemented       []   Not Implemented (RD to address)      [] No Longer Appropriate     [] No Recommendation Made     Discharge Planning: cardiac diet   [x] Participated in care planning, discharge planning, & interdisciplinary rounds as appropriate      Summer Mota Terrell Major RD   Pager: [phone]

## 2018-04-20 NOTE — ROUTINE PROCESS
Bedside shift change report given to Damian Viera RN (oncoming nurse) by Noemi Constantino RN (offgoing nurse). Report included the following information SBAR, Kardex, Recent Results and Cardiac Rhythm A. Fib.

## 2018-04-20 NOTE — PROGRESS NOTES
[facility] - Progress Note  Admit Date: 4/18/2018    Assessment:     Hospital Problems  Date Reviewed: 4/19/2018          Codes Class Noted POA    Knee pain ICD-10-CM: M25.569  ICD-9-CM: 719.46  4/19/2018 Unknown        Atrial fibrillation with rapid ventricular response ([facility] [address] ICD-10-CM: I48.91  ICD-9-CM: 427.31  4/19/2018 Unknown        Acute kidney injury [facility]) ICD-10-CM: N17.9  ICD-9-CM: 584.9  4/19/2018 Unknown        * (Principal)Rapid atrial fibrillation (HCC) ICD-10-CM: I48.91  ICD-9-CM: 427.31  4/18/2018 Unknown              -Persistent atrial fibrillation, presented with RVR, in setting of recent difficulty taking pills due to N/V. Patient taking metoprolol 50 mg bid for rate control as outpatient, warfarin 3 mg daily for [address]. -Hypertension, elevated on admission.  -Nausea and vomiting, persistent over the few days, now resolved. -Acute on chronic kidney injury, suspect intravascular volume depletion in setting of N/V, improved with hydration. Creatinine 1.8 4/12/2018.  -Chronic diastolic heart failure. Normal LV function EF 55% on echo 3/2018 at [facility] office. On maintenance Bumex 2 mg bid as outpatient. Will need to follow fluid status closely on fluids for EMMA. -Recent right knee surgery. Discharge from 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 Moderate MR on echo 2013.  -H/o TIA.    Primary cardiologist Dr. Radha Krishna. Reviewed records received from their office this am.     Plan:     HR remains elevated, BP stable, will increase lopressor and wean off cardizem drip. If wheezing worsening, will need to consider switching lopressor to PO cardizem. Patient with wheezing on exam today. Bronchitis versus fluid overload. Did get IV fluids for EMMA on admission. Renal function appears baseline. Will give one dose IV bumex now and start back maintenance bumex.   Would continue anticoagulation with warfarin with heparin bridge while in house, INR 1.7. If rates stable off cardizem and breathing stable s/p Iv lasix, ok to discharge from cardiac standpoint with follow up with Peggy Ruiz. Subjective:     No new complaints. Feel ok but noted some wheezing this Am. Hr overall low 100s.     Objective:      Patient Vitals for the past 8 hrs:   Temp Pulse Resp BP SpO2   04/20/18 0735 98.3 °F (36.8 °C) (!) 112 20 (!) 148/91 97 %   04/20/18 0316 98.6 °F (37 °C) (!) 117 20 131/88 100 %         Patient Vitals for the past 96 hrs:   Weight   04/20/18 0431 99.7 kg (219 lb 14.4 oz)   04/18/18 1930 92.5 kg (204 lb)                    Intake/Output Summary (Last 24 hours) at 04/20/18 0931  Last data filed at 04/20/18 0813   Gross per 24 hour   Intake           1840.4 ml   Output             1900 ml   Net            -59.6 ml       Physical Exam:  General:  alert, cooperative, no distress, appears stated age  Neck:  no JVD  Lungs:  wheezes scattered  Heart:  irregularly irregular rhythm  Abdomen:  abdomen is soft without significant tenderness, masses, organomegaly or guarding  Extremities:  extremities normal, atraumatic, no cyanosis or edema    Data Review:     Labs: Results:       Chemistry Recent Labs      04/20/18 0250 04/19/18   0547  04/18/18 1949   GLU  108*  129*  150*   NA  137  137  133*   K  4.2  4.3  4.2   CL  107  105  98*   CO2  23  23  26   BUN  32*  51*  51*   CREA  1.89*  2.62*  3.40*   CA  8.4*  7.7*  8.9   MG  1.9   --    --    PHOS   --   3.2   --    AGAP  7  9  9   BUCR  17  19  15      CBC w/Diff Recent Labs      04/20/18   0250  04/19/18   0547  04/18/18 1949   WBC  7.8  7.9  8.2   RBC  3.15*  3.31*  3.69*   HGB  9.7*  10.1*  11.3*   HCT  29.2*  30.2*  33.6*   PLT  164  169  196   GRANS  81*  78*  78*   LYMPH  9*  12*  14*   EOS  2  1  1      Cardiac Enzymes Lab Results   Component Value Date/Time     04/19/2018 05:00 PM     04/19/2018 11:45 AM    CKMB 2.7 04/19/2018 05:00 PM    CKMB 2.5 04/19/2018 11:45 AM    CKND1 2.0 04/19/2018 05:00 PM    CKND1 1.8 04/19/2018 11:45 AM    TROIQ 0.04 04/19/2018 05:00 PM    TROIQ 0.04 04/19/2018 11:45 AM      Coagulation Recent Labs      04/20/18   0250  04/19/18   0547   PTP  18.9*  19.7*   INR  1.7*  1.7*   APTT  105.0*  81.2*       Lipid Panel No results found for: CHOL, CHOLPOCT, CHOLX, CHLST, CHOLV, 752173, HDL, LDL, LDLC, DLDLP, 610376, VLDLC, VLDL, TGLX, TRIGL, TRIGP, TGLPOCT, CHHD, CHHDX   BNP No results found for: BNP, BNPP, XBNPT   Liver Enzymes No results for input(s): TP, ALB, TBIL, AP, SGOT, GPT in the last 72 hours.     No lab exists for component: DBIL   Digoxin    Thyroid Studies No results found for: T4, T3U, TSH, TSHEXT       Signed By: MINDY Whatley     April 20, 2018

## 2018-04-20 NOTE — ROUTINE PROCESS
Bedside report received from [facility]. Patient awake and family member at bs. Verified Heprin and Cardizem drips, on guard rail. IV sites without signs of infiltration. Call light  and urinal in reach.

## 2018-04-20 NOTE — PROGRESS NOTES
, B/P 131/88, MEWs -3, patient quietly resting, remains on Cardizem drip at 10 mg/hr. Patient asymptomatic, will continue to monitor patient.

## 2018-04-21 LAB
ANION GAP SERPL CALC-SCNC: 5 MMOL/L (ref 3–18)
APTT PPP: 112.8 SEC (ref 23–36.4)
APTT PPP: 125.7 SEC (ref 23–36.4)
APTT PPP: 79.7 SEC (ref 23–36.4)
BASOPHILS # BLD: 0 K/UL (ref 0–0.1)
BASOPHILS NFR BLD: 0 % (ref 0–2)
BUN SERPL-MCNC: 26 MG/DL (ref 7–18)
BUN/CREAT SERPL: 14 (ref 12–20)
CALCIUM SERPL-MCNC: 8.9 MG/DL (ref 8.5–10.1)
CHLORIDE SERPL-SCNC: 104 MMOL/L (ref 100–108)
CO2 SERPL-SCNC: 28 MMOL/L (ref 21–32)
CREAT SERPL-MCNC: 1.91 MG/DL (ref 0.6–1.3)
DIFFERENTIAL METHOD BLD: ABNORMAL
EOSINOPHIL # BLD: 0 K/UL (ref 0–0.4)
EOSINOPHIL NFR BLD: 0 % (ref 0–5)
ERYTHROCYTE [DISTWIDTH] IN BLOOD BY AUTOMATED COUNT: 15.9 % (ref 11.6–14.5)
GLUCOSE SERPL-MCNC: 153 MG/DL (ref 74–99)
HCT VFR BLD AUTO: 28.5 % (ref 36–48)
HGB BLD-MCNC: 9.3 G/DL (ref 13–16)
INR PPP: 1.6 (ref 0.8–1.2)
LYMPHOCYTES # BLD: 0.5 K/UL (ref 0.9–3.6)
LYMPHOCYTES NFR BLD: 7 % (ref 21–52)
MAGNESIUM SERPL-MCNC: 1.9 MG/DL (ref 1.6–2.6)
MCH RBC QN AUTO: 30.4 PG (ref 24–34)
MCHC RBC AUTO-ENTMCNC: 32.6 G/DL (ref 31–37)
MCV RBC AUTO: 93.1 FL (ref 74–97)
MONOCYTES # BLD: 0.4 K/UL (ref 0.05–1.2)
MONOCYTES NFR BLD: 6 % (ref 3–10)
NEUTS SEG # BLD: 6.2 K/UL (ref 1.8–8)
NEUTS SEG NFR BLD: 87 % (ref 40–73)
PLATELET # BLD AUTO: 195 K/UL (ref 135–420)
PMV BLD AUTO: 11.5 FL (ref 9.2–11.8)
POTASSIUM SERPL-SCNC: 4.4 MMOL/L (ref 3.5–5.5)
PROTHROMBIN TIME: 18.1 SEC (ref 11.5–15.2)
RBC # BLD AUTO: 3.06 M/UL (ref 4.7–5.5)
SODIUM SERPL-SCNC: 137 MMOL/L (ref 136–145)
WBC # BLD AUTO: 7.1 K/UL (ref 4.6–13.2)

## 2018-04-21 PROCEDURE — 74011250636 HC RX REV CODE- 250/636: Performed by: EMERGENCY MEDICINE

## 2018-04-21 PROCEDURE — 74011000250 HC RX REV CODE- 250: Performed by: NURSE PRACTITIONER

## 2018-04-21 PROCEDURE — 65660000000 HC RM CCU STEPDOWN

## 2018-04-21 PROCEDURE — 74011250637 HC RX REV CODE- 250/637: Performed by: PHYSICIAN ASSISTANT

## 2018-04-21 PROCEDURE — 74011000250 HC RX REV CODE- 250: Performed by: INTERNAL MEDICINE

## 2018-04-21 PROCEDURE — 97165 OT EVAL LOW COMPLEX 30 MIN: CPT

## 2018-04-21 PROCEDURE — 74011250637 HC RX REV CODE- 250/637: Performed by: INTERNAL MEDICINE

## 2018-04-21 PROCEDURE — 85610 PROTHROMBIN TIME: CPT | Performed by: NURSE PRACTITIONER

## 2018-04-21 PROCEDURE — 85730 THROMBOPLASTIN TIME PARTIAL: CPT | Performed by: EMERGENCY MEDICINE

## 2018-04-21 PROCEDURE — 85730 THROMBOPLASTIN TIME PARTIAL: CPT | Performed by: NURSE PRACTITIONER

## 2018-04-21 PROCEDURE — 85025 COMPLETE CBC W/AUTO DIFF WBC: CPT | Performed by: NURSE PRACTITIONER

## 2018-04-21 PROCEDURE — 83735 ASSAY OF MAGNESIUM: CPT | Performed by: NURSE PRACTITIONER

## 2018-04-21 PROCEDURE — 94640 AIRWAY INHALATION TREATMENT: CPT

## 2018-04-21 PROCEDURE — 36415 COLL VENOUS BLD VENIPUNCTURE: CPT | Performed by: NURSE PRACTITIONER

## 2018-04-21 PROCEDURE — 74011000258 HC RX REV CODE- 258: Performed by: PHYSICIAN ASSISTANT

## 2018-04-21 PROCEDURE — 74011250637 HC RX REV CODE- 250/637: Performed by: EMERGENCY MEDICINE

## 2018-04-21 PROCEDURE — 74011636637 HC RX REV CODE- 636/637: Performed by: INTERNAL MEDICINE

## 2018-04-21 PROCEDURE — 74011000250 HC RX REV CODE- 250: Performed by: PHYSICIAN ASSISTANT

## 2018-04-21 PROCEDURE — 80048 BASIC METABOLIC PNL TOTAL CA: CPT | Performed by: NURSE PRACTITIONER

## 2018-04-21 PROCEDURE — 85730 THROMBOPLASTIN TIME PARTIAL: CPT | Performed by: INTERNAL MEDICINE

## 2018-04-21 PROCEDURE — 77010033678 HC OXYGEN DAILY

## 2018-04-21 RX ORDER — BISACODYL 5 MG
5 TABLET, DELAYED RELEASE (ENTERIC COATED) ORAL
Status: COMPLETED | OUTPATIENT
Start: 2018-04-21 | End: 2018-04-21

## 2018-04-21 RX ORDER — DOCUSATE SODIUM 100 MG/1
100 CAPSULE, LIQUID FILLED ORAL 2 TIMES DAILY
Status: DISCONTINUED | OUTPATIENT
Start: 2018-04-21 | End: 2018-04-24 | Stop reason: HOSPADM

## 2018-04-21 RX ORDER — FACIAL-BODY WIPES
10 EACH TOPICAL DAILY PRN
Status: DISCONTINUED | OUTPATIENT
Start: 2018-04-21 | End: 2018-04-24 | Stop reason: HOSPADM

## 2018-04-21 RX ORDER — METOPROLOL TARTRATE 50 MG/1
100 TABLET ORAL EVERY 12 HOURS
Status: DISCONTINUED | OUTPATIENT
Start: 2018-04-21 | End: 2018-04-24 | Stop reason: HOSPADM

## 2018-04-21 RX ADMIN — HEPARIN SODIUM 14 UNITS/KG/HR: 10000 INJECTION, SOLUTION INTRAVENOUS at 23:03

## 2018-04-21 RX ADMIN — SODIUM CHLORIDE 20 MG: 9 INJECTION INTRAMUSCULAR; INTRAVENOUS; SUBCUTANEOUS at 08:33

## 2018-04-21 RX ADMIN — OXYCODONE HYDROCHLORIDE AND ACETAMINOPHEN 1 TABLET: 5; 325 TABLET ORAL at 11:18

## 2018-04-21 RX ADMIN — IPRATROPIUM BROMIDE 0.5 MG: 0.5 SOLUTION RESPIRATORY (INHALATION) at 14:44

## 2018-04-21 RX ADMIN — METOPROLOL TARTRATE 100 MG: 50 TABLET ORAL at 21:52

## 2018-04-21 RX ADMIN — IPRATROPIUM BROMIDE 0.5 MG: 0.5 SOLUTION RESPIRATORY (INHALATION) at 01:21

## 2018-04-21 RX ADMIN — IPRATROPIUM BROMIDE 0.5 MG: 0.5 SOLUTION RESPIRATORY (INHALATION) at 08:14

## 2018-04-21 RX ADMIN — IPRATROPIUM BROMIDE 0.5 MG: 0.5 SOLUTION RESPIRATORY (INHALATION) at 20:05

## 2018-04-21 RX ADMIN — BUMETANIDE 1 MG: 1 TABLET ORAL at 08:33

## 2018-04-21 RX ADMIN — WARFARIN SODIUM 6 MG: 5 TABLET ORAL at 17:18

## 2018-04-21 RX ADMIN — METOPROLOL TARTRATE 75 MG: 50 TABLET ORAL at 08:33

## 2018-04-21 RX ADMIN — BISACODYL 5 MG: 5 TABLET, COATED ORAL at 18:16

## 2018-04-21 RX ADMIN — PREDNISONE 40 MG: 20 TABLET ORAL at 08:33

## 2018-04-21 RX ADMIN — DOCUSATE SODIUM 100 MG: 100 CAPSULE, LIQUID FILLED ORAL at 18:16

## 2018-04-21 RX ADMIN — HEPARIN SODIUM 16 UNITS/KG/HR: 10000 INJECTION, SOLUTION INTRAVENOUS at 05:11

## 2018-04-21 RX ADMIN — SODIUM CHLORIDE 5 MG/HR: 900 INJECTION, SOLUTION INTRAVENOUS at 15:12

## 2018-04-21 RX ADMIN — BUDESONIDE 500 MCG: 0.5 INHALANT RESPIRATORY (INHALATION) at 08:14

## 2018-04-21 RX ADMIN — BUDESONIDE 500 MCG: 0.5 INHALANT RESPIRATORY (INHALATION) at 20:05

## 2018-04-21 RX ADMIN — BUMETANIDE 1 MG: 1 TABLET ORAL at 17:21

## 2018-04-21 NOTE — PROGRESS NOTES
Problem: Falls - Risk of  Goal: *Absence of Falls  Document Alia Fall Risk and appropriate interventions in the flowsheet.    Outcome: Progressing Towards Goal  Fall Risk Interventions:  Mobility Interventions: Bed/chair exit alarm         Medication Interventions: Bed/chair exit alarm    Elimination Interventions: Bed/chair exit alarm

## 2018-04-21 NOTE — PROGRESS NOTES
[facility] Hospitalist Group  Progress Note    Patient: Lenny Pena Age: 71 y.o. : 1945 MR#: [id number] SSN: [id number]  Date: 2018     Subjective:     Patient sitting in bed, awake, alert, tolerating diet, denies nausea, vomiting, abdominal pain.  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) 115    Temp 97 °F (36.1 °C)    Resp 20    Ht 5' 11\" (1.803 m)    Wt 96.4 kg (212 lb 8 oz)    SpO2 99%    BMI 29.64 kg/m2      Tmax/24hrs: Temp (24hrs), Av.5 °F (36.4 °C), Min:97 °F (36.1 °C), Max:98.3 °F (36.8 °C)      Intake/Output Summary (Last 24 hours) at 18 1709  Last data filed at 18 1521   Gross per 24 hour   Intake              242 ml   Output             3700 ml   Net            -3458 ml       General:  Awake, alert  Cardiovascular:  S1S2+, RRR  Pulmonary:  CTA b/l  GI:  Soft, BS+, NT, ND  Extremities:  trace edema      Labs:    Recent Results (from the past 24 hour(s))   CBC WITH AUTOMATED DIFF    Collection Time: 18  3:08 AM   Result Value Ref Range    WBC 7.1 4.6 - 13.2 K/uL    RBC 3.06 (L) 4.70 - 5.50 M/uL    HGB 9.3 (L) 13.0 - 16.0 g/dL    HCT 28.5 (L) 36.0 - 48.0 %    MCV 93.1 74.0 - 97.0 FL    MCH 30.4 24.0 - 34.0 PG    MCHC 32.6 31.0 - 37.0 g/dL    RDW 15.9 (H) 11.6 - 14.5 %    PLATELET 914 737 - 217 K/uL    MPV 11.5 9.2 - 11.8 FL NEUTROPHILS 87 (H) 40 - 73 %    LYMPHOCYTES 7 (L) 21 - 52 %    MONOCYTES 6 3 - 10 %    EOSINOPHILS 0 0 - 5 %    BASOPHILS 0 0 - 2 %    ABS. NEUTROPHILS 6.2 1.8 - 8.0 K/UL    ABS. LYMPHOCYTES 0.5 (L) 0.9 - 3.6 K/UL    ABS. MONOCYTES 0.4 0.05 - 1.2 K/UL    ABS. EOSINOPHILS 0.0 0.0 - 0.4 K/UL    ABS.  BASOPHILS 0.0 0.0 - 0.1 K/UL    DF AUTOMATED     METABOLIC PANEL, BASIC    Collection Time: 04/21/18  3:08 AM   Result Value Ref Range    Sodium 137 136 - 145 mmol/L    Potassium 4.4 3.5 - 5.5 mmol/L    Chloride 104 100 - 108 mmol/L    CO2 28 21 - 32 mmol/L    Anion gap 5 3.0 - 18 mmol/L    Glucose 153 (H) 74 - 99 mg/dL    BUN 26 (H) 7.0 - 18 MG/DL    Creatinine 1.91 (H) 0.6 - 1.3 MG/DL    BUN/Creatinine ratio 14 12 - 20      GFR est AA 42 (L) >60 ml/min/1.73m2    GFR est non-AA 35 (L) >60 ml/min/1.73m2    Calcium 8.9 8.5 - 10.1 MG/DL   MAGNESIUM    Collection Time: 04/21/18  3:08 AM   Result Value Ref Range    Magnesium 1.9 1.6 - 2.6 mg/dL   PROTHROMBIN TIME + INR    Collection Time: 04/21/18  3:08 AM   Result Value Ref Range    Prothrombin time 18.1 (H) 11.5 - 15.2 sec    INR 1.6 (H) 0.8 - 1.2     PTT    Collection Time: 04/21/18  3:08 AM   Result Value Ref Range    aPTT 112.8 (H) 23.0 - 36.4 SEC   PTT    Collection Time: 04/21/18 11:13 AM   Result Value Ref Range    aPTT 125.7 (H) 23.0 - 36.4 SEC       Signed By: Chaitanya Cortes MD     April 21, 2018

## 2018-04-21 NOTE — PROGRESS NOTES
[facility]  Pulmonary, Critical Care, and Sleep Medicine    Name: Jaison Baeza MRN: [id number]   : 1945 Hospital: [address] [place] [address] Dr   Date: 2018        IMPRESSION:   · Wheezing- ? Reactive airways vs mild CHF. Low probability for PE  · Rapid atrial fibrillation - on cardizem and heparin  · Abdominal pain, Nausea and Vomiting- ? Embolic vs postop ileus. · EMMA - admission creatinine 3.40 (Baseline per chart review 1.3-1.6)- gentle hydration. 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 Underwent mitral valve repair with [place]-Lucien chordae and a 32 mm mitral ring annuloplasty at [facility]      Patient Active Problem List   Diagnosis Code    Rapid atrial fibrillation ([facility] I48.91    Knee pain M25.569    Atrial fibrillation with rapid ventricular response (HCC) I48.91    Acute kidney injury ([address] N17.9      PLAN:   Resp: Supplemental O2 via NC, titrate flow for goal SPO2> 90%, pulmonary hygiene care, Aspiration precautions, Keep HOB >30 degrees. Will continue bronchodilators and Pulmicort nebulized, short course of prednisone for wheezing  ID: No suspicion for infection at this time, continue to monitor  CVS: Continue cardizem and heparin gtt. Cardiology managing  Renal: Monitor I&Os, Trend Renal indices  Will follow till bronchospasm further improved. Plan to switch to inhaled agents prior to discharge     Subjective/Interval History:     18   Feels much better this am.  No c/o cough, SOB  + wheezing  Denies any further nausea/vomiting. Currently on IV heparin and Cardizem drip. HPI:  Patient is a 71 y. o. male with a history of CHF, AFib, HTN, TIA in  with no residual weakness, and Gout. Patient presented to the ED via EMS with shortness of breath, nausea, and dizziness.  Patient recently had a Right total knee replacement done at [facility]. Was followed up with a home visit where he was found to be tachycardic and had an elevated blood pressure. Started on Cardizem and then Amiodorone gtt, heparin. ROS:Pertinent items are noted in HPI. Objective:   Vital Signs:    Visit Vitals    BP (!) 145/94 (BP Patient Position: At rest)    Pulse (!) 105    Temp 97 °F (36.1 °C)    Resp 20    Ht 5' 11\" (1.803 m)    Wt 96.4 kg (212 lb 8 oz)    SpO2 100%    BMI 29.64 kg/m2       O2 Device: Nasal cannula   O2 Flow Rate (L/min): 2 l/min   Temp (24hrs), Av.8 °F (36.6 °C), Min:97 °F (36.1 °C), Max:98.3 °F (36.8 °C)       Intake/Output:   Last shift:         Last 3 shifts:  1901 -  0700  In: 2082.4 [P.O.:320; I.V.:1762.4]  Out: 5050 [Urine:5050]    Intake/Output Summary (Last 24 hours) at 18 0953  Last data filed at 18 0645   Gross per 24 hour   Intake              242 ml   Output             3150 ml   Net            -2908 ml        Physical Exam:    General: in no apparent distress, well developed and well nourished, non-toxic, in no respiratory distress and acyanotic, alert and oriented times 3   HEENT: Normal   Neck: No abnormally enlarged lymph nodes.    Chest: normal   Lungs: end expiratory wheeze- bilaterally   Heart: irregular rhythm   Abdomen: abdomen is soft without significant tenderness, masses, organomegaly or guarding   Extremity: negative, surgical site-clean   Neuro: alert   Skin: Skin color, texture, turgor normal. No rashes or lesions        DATA:  Labs:  Recent Labs      18   0308  18   0250  18   0547   WBC  7.1  7.8  7.9   HGB  9.3*  9.7*  10.1*   HCT  28.5*  29.2*  30.2*   PLT  195  164  169     Recent Labs      18   0308  18   0250  18   0547   NA  137  137  137   K  4.4  4.2  4.3   CL  104  107  105   CO2  28  23  23   GLU  153*  108*  129*   BUN  26*  32*  51*   CREA  1.91*  1.89*  2.62*   CA  8.9  8.4*  7.7*   MG  1.9  1.9   --    PHOS --    --   3.2   INR  1.6*  1.7*  1.7*     No results for input(s): PH, PCO2, PO2, HCO3, FIO2 in the last 72 hours. Echo:pending    Imaging:  [x]I have personally reviewed the patients radiographs    V/Q scan Lun2018:   FINDINGS: The ventilation radiotracer distribution is unremarkable. The  perfusion radiotracer distribution is unremarkable. No evidence of mismatch. IMPRESSION:  1. Low probability ventilation perfusion scan.     CXR Results  (Last 48 hours)    None        High complexity decision making was performed during the evaluation of this patient at high risk for decompensation with multiple organ involvement       Nj Fermin MD

## 2018-04-21 NOTE — PROGRESS NOTES
Problem: Self Care Deficits Care Plan (Adult)  Goal: *Acute Goals and Plan of Care (Insert Text)  Occupational Therapy EVALUATION/discharge    Patient: Divya Quiles (54 y.o. male)  Date: 4/21/2018  Primary Diagnosis: Rapid atrial fibrillation (HCC)  Rapid atrial fibrillation (HCC)  Atrial fibrillation with rapid ventricular response (HCC)  Acute kidney injury ([facility] [address]  Knee pain  Precautions: fall       ASSESSMENT AND RECOMMENDATIONS:  Based on the objective data described below, the patient presents with out functional deficits; therefore, skilled occupational therapy is not indicated at this time. Discharge Recommendations: Home Health  Further Equipment Recommendations for Discharge: N/A      Barriers to Learning/Limitations: None    COMPLEXITY     Eval Complexity: History: LOW Complexity : Brief history review ; Examination: LOW Complexity : 1-3 performance deficits relating to physical, cognitive , or psychosocial skils that result in activity limitations and / or participation restrictions ; Decision Making:LOW Complexity : No comorbidities that affect functional and no verbal or physical assistance needed to complete eval tasks  Assessment: LOW Complexity        G-CODES:     Self Care  Current  CI= 1-19%   Goal  CI= 1-19%   D/C  CI= 1-19%. The severity rating is based on the Level of Assistance required for Functional Mobility and ADLs. SUBJECTIVE:   Patient stated I think I am doing fine.     OBJECTIVE DATA SUMMARY:     Past Medical History:   Diagnosis Date    Arthritis     HTN (hypertension)      Past Surgical History:   Procedure Laterality Date    HX KNEE REPLACEMENT  2018     Prior Level of Function/Home Situation:   Home Situation  Home Environment: Private residence  One/Two Story Residence: One story  Living Alone: No  Support Systems: Family member(s), Spouse/Significant Other/Partner  Patient Expects to be Discharged to[de-identified] Private residence  Current DME Used/Available at Home: None  [x]     Right hand dominant   []     Left hand dominant  Cognitive/Behavioral Status:   this patient is oriented X 4   Skin: no skin integrity issues noted; surgical site was not observed  Edema: no extremity edema is noted  Vision/Perceptual:      tracking is WFL    Coordination:   BUE's WFL   Balance:   fair in standing  Strength:  BUE's: 4/5   Tone & Sensation:  BUE's WFL   Range of Motion:  BUE's AROM is WFL   Functional Mobility and Transfers for ADLs:  Bed Mobility:   supine to sit and return to supine is independent   Transfers:   SBA   ADL Assessment:   self feeding: independent  Grooming: independent (simulated at bed level)  UB bathing/dressing: independent (simulated at bed level)  LB bathing/dressing: modified independence (additional time)  Pain:  Pt reports 0/10 pain or discomfort prior to treatment.    Pt reports 0/10 pain or discomfort post treatment. Activity Tolerance:   No SOB or c/o fatigue noted  Please refer to the flowsheet for vital signs taken during this treatment. After treatment:   []  Patient left in no apparent distress sitting up in chair  [x]  Patient left in no apparent distress in bed  [x]  Call bell left within reach  []  Nursing notified  []  Caregiver present  []  Bed alarm activated    COMMUNICATION/EDUCATION:   Communication/Collaboration:  []      Home safety education was provided and the patient/caregiver indicated understanding. [x]      Patient/family have participated as able and agree with findings and recommendations. []      Patient is unable to participate in plan of care at this time.     Nargis Forbes OTR/L  Time Calculation: 9 mins

## 2018-04-21 NOTE — ROUTINE PROCESS
Bedside and Verbal shift change report given to  Shannon Palma RN (oncoming nurse) by Prabhjot Corona RN (offgoing nurse). Report included the following information SBAR, Kardex, MAR and Recent Results.     SITUATION:    Code Status: Full Code  Reason for Admission: Rapid atrial fibrillation (HCC)  Rapid atrial fibrillation (HCC)  Atrial fibrillation with rapid ventricular response ([address]  Acute kidney injury ([facility]   Knee pain    [facility] day: 2   Problem List:       Hospital Problems  Date Reviewed: 4/19/2018          Codes Class Noted POA    Knee pain ICD-10-CM: M25.569  ICD-9-CM: 719.46  4/19/2018 Unknown        Atrial fibrillation with rapid ventricular response ([facility] [address] ICD-10-CM: I48.91  ICD-9-CM: 427.31  4/19/2018 Unknown        Acute kidney injury ([facility] [address] ICD-10-CM: N17.9  ICD-9-CM: 584.9  4/19/2018 Unknown        * (Principal)Rapid atrial fibrillation (HCC) ICD-10-CM: I48.91  ICD-9-CM: 427.31  4/18/2018 Unknown              BACKGROUND:    Past Medical History:   Past Medical History:   Diagnosis Date    Arthritis     HTN (hypertension)          Patient taking anticoagulants yes     ASSESSMENT:    Changes in Assessment Throughout Shift:  none     Patient has Central Line: no Reasons if yes: na   Patient has Arshad Cath: no Reasons if yes: na      Last Vitals:     Vitals:    04/20/18 2004 04/21/18 0122 04/21/18 0315 04/21/18 0408   BP: (!) 152/102  130/89    Pulse: (!) 109  96    Resp: 20  20    Temp: 98.3 °F (36.8 °C)  97.5 °F (36.4 °C)    SpO2: 98% 95% 100%    Weight:    96.4 kg (212 lb 8 oz)   Height:            IV and DRAINS (will only show if present)   Peripheral IV 04/18/18 Right Hand-Site Assessment: Clean, dry, & intact  Peripheral IV 04/19/18 Right Forearm-Site Assessment: Clean, dry, & intact  Peripheral IV 04/18/18 Left Antecubital-Site Assessment: Clean, dry, & intact     WOUND (if present)   Wound Type:  none   Dressing present Dressing Present : No   Wound Concerns/Notes: none     PAIN    Pain Assessment    Pain Intensity 1: 0 (04/21/18 0405)    Pain Location 1: Knee    Pain Intervention(s) 1: Medication (see MAR)    Patient Stated Pain Goal: 0  o Interventions for Pain:  none  o Intervention effective: no and denies pain  o Time of last intervention:  See Mar   o Reassessment Completed: yes      Last 3 Weights:  Last 3 Recorded Weights in this Encounter    04/18/18 1930 04/20/18 0431 04/21/18 0408   Weight: 92.5 kg (204 lb) 99.7 kg (219 lb 14.4 oz) 96.4 kg (212 lb 8 oz)     Weight change: -3.357 kg (-7 lb 6.4 oz)     INTAKE/OUPUT    Current Shift:      Last three shifts: 04/19 1901 - 04/21 0700  In: 1840.4 [P.O.:320; I.V.:1520.4]  Out: 5050 [Urine:5050]     LAB RESULTS     Recent Labs      04/21/18   0308  04/20/18   0250  04/19/18   0547   WBC  7.1  7.8  7.9   HGB  9.3*  9.7*  10.1*   HCT  28.5*  29.2*  30.2*   PLT  195  164  169        Recent Labs      04/21/18   0308  04/20/18   0250  04/19/18   0547   NA  137  137  137   K  4.4  4.2  4.3   GLU  153*  108*  129*   BUN  26*  32*  51*   CREA  1.91*  1.89*  2.62*   CA  8.9  8.4*  7.7*   MG  1.9  1.9   --    INR  1.6*  1.7*  1.7*       RECOMMENDATIONS AND DISCHARGE PLANNING     1. Pending tests/procedures/ Plan of Care or Other Needs: blood transfusion and labs     2. Discharge plan for patient and Needs/Barriers: pending    3. Estimated Discharge Date: pending Posted on Whiteboard in Patients Room: yes      4. The patient's care plan was reviewed with the oncoming nurse. \"HEALS\" SAFETY CHECK      Fall Risk    Total Score: 3    Safety Measures: Safety Measures: Bed/Chair-Wheels locked, Bed in low position, Call light within reach    A safety check occurred in the patient's room between off going nurse and oncoming nurse listed above.     The safety check included the below items  Area Items   H  High Alert Medications - Verify all high alert medication drips (heparin, PCA, etc.)   E  Equipment - Suction is set up for ALL patients (with yanker)  - Red plugs utilized for all equipment (IV pumps, etc.)  - WOWs wiped down at end of shift.  - Room stocked with oxygen, suction, and other unit-specific supplies   A  Alarms - Bed alarm is set for fall risk patients  - Ensure chair alarm is in place and activated if patient is up in a chair   L  Lines - Check IV for any infiltration  - Arshad bag is empty if patient has a Arshad   - Tubing and IV bags are labeled   S  Safety   - Room is clean, patient is clean, and equipment is clean. - Hallways are clear from equipment besides carts. - Fall bracelet on for fall risk patients  - Ensure room is clear and free of clutter  - Suction is set up for ALL patients (with triny)  - Hallways are clear from equipment besides carts.    - Isolation precautions followed, supplies available outside room, sign posted     Jim Akers RN

## 2018-04-21 NOTE — ROUTINE PROCESS
Bedside and Verbal shift change report given to Divya Penny RN (oncoming nurse) by Abraham Blake (offgoing nurse). Report included the following information SBAR, Kardex, MAR and Recent Results.     SITUATION:    Code Status: Full Code  Reason for Admission: Rapid atrial fibrillation (HCC)  Rapid atrial fibrillation (HCC)  Atrial fibrillation with rapid ventricular response ([address]  Acute kidney injury ([facility]   Knee pain    [facility] day: 1   Problem List:       Hospital Problems  Date Reviewed: 4/19/2018          Codes Class Noted POA    Knee pain ICD-10-CM: M25.569  ICD-9-CM: 719.46  4/19/2018 Unknown        Atrial fibrillation with rapid ventricular response ([facility] [facility] ICD-10-CM: I48.91  ICD-9-CM: 427.31  4/19/2018 Unknown        Acute kidney injury ([facility] [address] ICD-10-CM: N17.9  ICD-9-CM: 584.9  4/19/2018 Unknown        * (Principal)Rapid atrial fibrillation (HCC) ICD-10-CM: I48.91  ICD-9-CM: 427.31  4/18/2018 Unknown              BACKGROUND:    Past Medical History:   Past Medical History:   Diagnosis Date    Arthritis     HTN (hypertension)          Patient taking anticoagulants yes     ASSESSMENT:    Changes in Assessment Throughout Shift:  none     Patient has Central Line: no Reasons if yes: na   Patient has Arshad Cath: no Reasons if yes: na      Last Vitals:     Vitals:    04/20/18 0735 04/20/18 0830 04/20/18 1130 04/20/18 1503   BP: (!) 148/91  131/88 158/77   Pulse: (!) 112  (!) 102 (!) 110   Resp: 20 20 20   Temp: 98.3 °F (36.8 °C)  98.2 °F (36.8 °C) 98.2 °F (36.8 °C)   SpO2: 97% 95% 94% 94%   Weight:       Height:            IV and DRAINS (will only show if present)   Peripheral IV 04/18/18 Right Hand-Site Assessment: Clean, dry, & intact  Peripheral IV 04/19/18 Right Forearm-Site Assessment: Clean, dry, & intact  Peripheral IV 04/18/18 Left Antecubital-Site Assessment: Clean, dry, & intact     WOUND (if present)   Wound Type:  none   Dressing present Dressing Present : No   Wound Concerns/Notes: none     PAIN    Pain Assessment    Pain Intensity 1: 0 (04/20/18 1800)    Pain Location 1: Knee    Pain Intervention(s) 1: Medication (see MAR)    Patient Stated Pain Goal: 0  o Interventions for Pain:  none  o Intervention effective: no and denies pain  o Time of last intervention:  See Mar   o Reassessment Completed: yes      Last 3 Weights:  Last 3 Recorded Weights in this Encounter    04/18/18 1930 04/20/18 0431   Weight: 92.5 kg (204 lb) 99.7 kg (219 lb 14.4 oz)     Weight change: 7.212 kg (15 lb 14.4 oz)     INTAKE/OUPUT    Current Shift:      Last three shifts: 04/19 0701 - 04/20 1900  In: 1840.4 [P.O.:320; I.V.:1520.4]  Out: 3000 [Urine:3000]     LAB RESULTS     Recent Labs      04/20/18   0250  04/19/18   0547  04/18/18 1949   WBC  7.8  7.9  8.2   HGB  9.7*  10.1*  11.3*   HCT  29.2*  30.2*  33.6*   PLT  164  169  196        Recent Labs      04/20/18   0250  04/19/18   0547  04/18/18 1949   NA  137  137  133*   K  4.2  4.3  4.2   GLU  108*  129*  150*   BUN  32*  51*  51*   CREA  1.89*  2.62*  3.40*   CA  8.4*  7.7*  8.9   MG  1.9   --    --    INR  1.7*  1.7*  1.6*       RECOMMENDATIONS AND DISCHARGE PLANNING     1. Pending tests/procedures/ Plan of Care or Other Needs: blood transfusion and labs     2. Discharge plan for patient and Needs/Barriers: pending    3. Estimated Discharge Date: pending Posted on Whiteboard in Patients Room: yes      4. The patient's care plan was reviewed with the oncoming nurse. \"HEALS\" SAFETY CHECK      Fall Risk    Total Score: 3    Safety Measures: Safety Measures: Bed/Chair-Wheels locked, Bed in low position, Call light within reach, Fall prevention (comment), Gripper socks, Side rails X2    A safety check occurred in the patient's room between off going nurse and oncoming nurse listed above.     The safety check included the below items  Area Items   H  High Alert Medications - Verify all high alert medication drips (heparin, PCA, etc.)   E  Equipment - Suction is set up for ALL patients (with triny)  - Red plugs utilized for all equipment (IV pumps, etc.)  - WOWs wiped down at end of shift.  - Room stocked with oxygen, suction, and other unit-specific supplies   A  Alarms - Bed alarm is set for fall risk patients  - Ensure chair alarm is in place and activated if patient is up in a chair   L  Lines - Check IV for any infiltration  - Arshad bag is empty if patient has a Arshad   - Tubing and IV bags are labeled   S  Safety   - Room is clean, patient is clean, and equipment is clean. - Hallways are clear from equipment besides carts. - Fall bracelet on for fall risk patients  - Ensure room is clear and free of clutter  - Suction is set up for ALL patients (with triny)  - Hallways are clear from equipment besides carts.    - Isolation precautions followed, supplies available outside room, sign posted     Hillary Parrish

## 2018-04-21 NOTE — ROUTINE PROCESS
Bedside and Verbal shift change report [facility] to [facility] ([facility] nurse) by adamaris PIERRE (offgoing nurse). Report included the following information Kardex, Intake/Output and MAR.

## 2018-04-22 LAB
ANION GAP SERPL CALC-SCNC: 5 MMOL/L (ref 3–18)
APTT PPP: 87.4 SEC (ref 23–36.4)
BUN SERPL-MCNC: 28 MG/DL (ref 7–18)
BUN/CREAT SERPL: 15 (ref 12–20)
CALCIUM SERPL-MCNC: 8.9 MG/DL (ref 8.5–10.1)
CHLORIDE SERPL-SCNC: 98 MMOL/L (ref 100–108)
CO2 SERPL-SCNC: 33 MMOL/L (ref 21–32)
CREAT SERPL-MCNC: 1.88 MG/DL (ref 0.6–1.3)
GLUCOSE SERPL-MCNC: 172 MG/DL (ref 74–99)
INR PPP: 1.5 (ref 0.8–1.2)
MAGNESIUM SERPL-MCNC: 1.8 MG/DL (ref 1.6–2.6)
POTASSIUM SERPL-SCNC: 4 MMOL/L (ref 3.5–5.5)
PROTHROMBIN TIME: 17.6 SEC (ref 11.5–15.2)
SODIUM SERPL-SCNC: 136 MMOL/L (ref 136–145)

## 2018-04-22 PROCEDURE — 74011250637 HC RX REV CODE- 250/637: Performed by: EMERGENCY MEDICINE

## 2018-04-22 PROCEDURE — 97110 THERAPEUTIC EXERCISES: CPT

## 2018-04-22 PROCEDURE — 74011000250 HC RX REV CODE- 250: Performed by: INTERNAL MEDICINE

## 2018-04-22 PROCEDURE — 74011000258 HC RX REV CODE- 258: Performed by: PHYSICIAN ASSISTANT

## 2018-04-22 PROCEDURE — 65660000000 HC RM CCU STEPDOWN

## 2018-04-22 PROCEDURE — 83735 ASSAY OF MAGNESIUM: CPT | Performed by: NURSE PRACTITIONER

## 2018-04-22 PROCEDURE — 74011250637 HC RX REV CODE- 250/637: Performed by: PHYSICIAN ASSISTANT

## 2018-04-22 PROCEDURE — 80048 BASIC METABOLIC PNL TOTAL CA: CPT | Performed by: EMERGENCY MEDICINE

## 2018-04-22 PROCEDURE — 74011000250 HC RX REV CODE- 250: Performed by: PHYSICIAN ASSISTANT

## 2018-04-22 PROCEDURE — 94640 AIRWAY INHALATION TREATMENT: CPT

## 2018-04-22 PROCEDURE — 85610 PROTHROMBIN TIME: CPT | Performed by: EMERGENCY MEDICINE

## 2018-04-22 PROCEDURE — 74011636637 HC RX REV CODE- 636/637: Performed by: INTERNAL MEDICINE

## 2018-04-22 PROCEDURE — 36415 COLL VENOUS BLD VENIPUNCTURE: CPT | Performed by: NURSE PRACTITIONER

## 2018-04-22 PROCEDURE — 74011000250 HC RX REV CODE- 250: Performed by: NURSE PRACTITIONER

## 2018-04-22 PROCEDURE — 85730 THROMBOPLASTIN TIME PARTIAL: CPT | Performed by: EMERGENCY MEDICINE

## 2018-04-22 PROCEDURE — 74011250636 HC RX REV CODE- 250/636: Performed by: EMERGENCY MEDICINE

## 2018-04-22 PROCEDURE — 97116 GAIT TRAINING THERAPY: CPT

## 2018-04-22 RX ORDER — WARFARIN 7.5 MG/1
7.5 TABLET ORAL ONCE
Status: COMPLETED | OUTPATIENT
Start: 2018-04-22 | End: 2018-04-22

## 2018-04-22 RX ORDER — ALBUTEROL SULFATE 1.25 MG/3ML
1.25 SOLUTION RESPIRATORY (INHALATION)
Status: DISCONTINUED | OUTPATIENT
Start: 2018-04-22 | End: 2018-04-24 | Stop reason: HOSPADM

## 2018-04-22 RX ADMIN — DOCUSATE SODIUM 100 MG: 100 CAPSULE, LIQUID FILLED ORAL at 16:30

## 2018-04-22 RX ADMIN — PREDNISONE 40 MG: 20 TABLET ORAL at 07:53

## 2018-04-22 RX ADMIN — BUMETANIDE 1 MG: 1 TABLET ORAL at 16:30

## 2018-04-22 RX ADMIN — SODIUM CHLORIDE 20 MG: 9 INJECTION INTRAMUSCULAR; INTRAVENOUS; SUBCUTANEOUS at 08:03

## 2018-04-22 RX ADMIN — SODIUM CHLORIDE 5 MG/HR: 900 INJECTION, SOLUTION INTRAVENOUS at 07:53

## 2018-04-22 RX ADMIN — IPRATROPIUM BROMIDE 0.5 MG: 0.5 SOLUTION RESPIRATORY (INHALATION) at 01:25

## 2018-04-22 RX ADMIN — BUDESONIDE 500 MCG: 0.5 INHALANT RESPIRATORY (INHALATION) at 07:15

## 2018-04-22 RX ADMIN — BUMETANIDE 1 MG: 1 TABLET ORAL at 08:03

## 2018-04-22 RX ADMIN — DOCUSATE SODIUM 100 MG: 100 CAPSULE, LIQUID FILLED ORAL at 08:03

## 2018-04-22 RX ADMIN — METOPROLOL TARTRATE 100 MG: 50 TABLET ORAL at 21:46

## 2018-04-22 RX ADMIN — WARFARIN SODIUM 7.5 MG: 7.5 TABLET ORAL at 16:30

## 2018-04-22 RX ADMIN — SODIUM CHLORIDE 5 MG/HR: 900 INJECTION, SOLUTION INTRAVENOUS at 21:46

## 2018-04-22 RX ADMIN — IPRATROPIUM BROMIDE 0.5 MG: 0.5 SOLUTION RESPIRATORY (INHALATION) at 07:15

## 2018-04-22 RX ADMIN — HEPARIN SODIUM 14 UNITS/KG/HR: 10000 INJECTION, SOLUTION INTRAVENOUS at 20:04

## 2018-04-22 RX ADMIN — METOPROLOL TARTRATE 100 MG: 50 TABLET ORAL at 08:03

## 2018-04-22 NOTE — PROGRESS NOTES
Problem: Mobility Impaired (Adult and Pediatric)  Goal: *Acute Goals and Plan of Care (Insert Text)  Physical Therapy Goals  Initiated 4/20/2018 and to be accomplished within 7 day(s)  1. Patient will move from supine to sit and sit to supine , scoot up and down and roll side to side in bed with supervision/set-up. 2.  Patient will transfer from bed to chair and chair to bed with supervision/set-up using the least restrictive device. 3.  Patient will perform sit to stand with supervision/set-up. 4.  Patient will ambulate with supervision/set-up for >150 feet with the least restrictive device. 5.  Patient will ascend/descend 4 stairs with 1 handrail(s) with supervision/set-up. physical Therapy TREATMENT    Patient: Karely Santiago (15 y.o. male)  Date: 4/22/2018  Diagnosis: Rapid atrial fibrillation (HCC)  Rapid atrial fibrillation (HCC)  Atrial fibrillation with rapid ventricular response (HCC)  Acute kidney injury ([facility]  Knee pain Rapid atrial fibrillation (HCC)       Precautions:    Chart, physical therapy assessment, plan of care and goals were reviewed. ASSESSMENT:  Pt states his knee is doing much better. No difficulty getting up and going to restroom per pt. AK remains elevated, 111 in sitting, increases to 128 w/ short walk to bedside chair. Returns to <120 within a minute. Pt demonstrates good ability with bed mobility, transfers and gait. Sitting in bedside chair, pt instructed to perform multiple exercises to improve LE strength for amb. Needs left within reach, nursing notified. Progression toward goals:  [x]      Improving appropriately and progressing toward goals  []      Improving slowly and progressing toward goals  []      Not making progress toward goals and plan of care will be adjusted     PLAN:  Patient continues to benefit from skilled intervention to address the above impairments. Continue treatment per established plan of care.   Discharge Recommendations:  Home Health  Further Equipment Recommendations for Discharge:  rolling walker and N/A     SUBJECTIVE:   Patient stated It's miraculous.   Mobility [id number] Current  CI= 1-19%   Goal  CI= 1-19%. The severity rating is based on the Other level of assistance required for functional mobility and ADLs. OBJECTIVE DATA SUMMARY:   Critical Behavior:  Neurologic State: Alert  Orientation Level: Oriented X4  Cognition: Follows commands     Functional Mobility Training:  Bed Mobility:     Supine to Sit: Modified independent     Scooting: Modified independent     Transfers:  Sit to Stand: Supervision  Stand to Sit: Supervision        Balance:  Sitting: Intact  Standing: Impaired; With support  Standing - Static: Good  Standing - Dynamic : Fair  Ambulation/Gait Training:  Distance (ft): 8 Feet (ft)  Assistive Device: Walker, rolling  Ambulation - Level of Assistance: Contact guard assistance        Gait Abnormalities: Decreased step clearance     Speed/Dorothy: Slow;Shuffled  Step Length: Right shortened;Left shortened     Therapeutic Exercises:   Seated LAQ, marches, glute squeeze x20 ea. Pain:  Pt pain was reported as  0 pre-treatment. Pt pain was reported as 0 post-treatment. Activity Tolerance:   Good  Please refer to the flowsheet for vital signs taken during this treatment.   After treatment:   [] Patient left in no apparent distress sitting up in chair  [] Patient left in no apparent distress in bed  [] Call bell left within reach  [] Nursing notified  [] Caregiver present  [] Bed alarm activated      Lashon Carroll PTA   Time Calculation: 23 mins

## 2018-04-22 NOTE — ROUTINE PROCESS
Bedside and Verbal shift change report given to Cookie Storm RN (oncoming nurse) by Luciano Wellington RN (offgoing nurse). Report included the following information SBAR, Kardex, MAR and Recent Results.     SITUATION:    Code Status: Full Code  Reason for Admission: Rapid atrial fibrillation (HCC)  Rapid atrial fibrillation (HCC)  Atrial fibrillation with rapid ventricular response ([address]  Acute kidney injury ([facility] [address]   Knee pain    [facility] day: 3   Problem List:       Hospital Problems  Date Reviewed: 4/19/2018          Codes Class Noted POA    Knee pain ICD-10-CM: M25.569  ICD-9-CM: 719.46  4/19/2018 Unknown        Atrial fibrillation with rapid ventricular response ([facility] [address] ICD-10-CM: I48.91  ICD-9-CM: 427.31  4/19/2018 Unknown        Acute kidney injury ([facility] [address] ICD-10-CM: N17.9  ICD-9-CM: 584.9  4/19/2018 Unknown        * (Principal)Rapid atrial fibrillation (HCC) ICD-10-CM: I48.91  ICD-9-CM: 427.31  4/18/2018 Unknown              BACKGROUND:    Past Medical History:   Past Medical History:   Diagnosis Date    Arthritis     HTN (hypertension)          Patient taking anticoagulants yes     ASSESSMENT:    Changes in Assessment Throughout Shift:  none     Patient has Central Line: no Reasons if yes: na   Patient has Arshad Cath: no Reasons if yes: na      Last Vitals:     Vitals:    04/21/18 2318 04/22/18 0127 04/22/18 0436 04/22/18 0725   BP: (!) 135/97  (!) 144/92 (!) 130/95   Pulse: 92  (!) 114 (!) 106   Resp: 20  20 20   Temp: 98.4 °F (36.9 °C)  97.8 °F (36.6 °C) 97.9 °F (36.6 °C)   SpO2: 99% 98% 100% 97%   Weight:   95.5 kg (210 lb 8 oz)    Height:            IV and DRAINS (will only show if present)   Peripheral IV 04/18/18 Right Hand-Site Assessment: Clean, dry, & intact  Peripheral IV 04/19/18 Right Forearm-Site Assessment: Clean, dry, & intact  Peripheral IV 04/18/18 Left Antecubital-Site Assessment: Clean, dry, & intact     WOUND (if present)   Wound Type:  none   Dressing present Dressing Present : No   Wound Concerns/Notes:  none     PAIN    Pain Assessment    Pain Intensity 1: 0 (04/22/18 0400)    Pain Location 1: Knee    Pain Intervention(s) 1: Medication (see MAR)    Patient Stated Pain Goal: 0  o Interventions for Pain:  none  o Intervention effective: no and denies pain  o Time of last intervention:  See Mar   o Reassessment Completed: yes      Last 3 Weights:  Last 3 Recorded Weights in this Encounter    04/20/18 0431 04/21/18 0408 04/22/18 0436   Weight: 99.7 kg (219 lb 14.4 oz) 96.4 kg (212 lb 8 oz) 95.5 kg (210 lb 8 oz)     Weight change: -0.907 kg (-2 lb)     INTAKE/OUPUT    Current Shift: 04/22 0701 - 04/22 1900  In: -   Out: 625 [Urine:625]    Last three shifts: 04/20 1901 - 04/22 0700  In: 482 [P.O.:240; I.V.:242]  Out: 5927 [Urine:5925]     LAB RESULTS     Recent Labs      04/21/18   0308  04/20/18   0250   WBC  7.1  7.8   HGB  9.3*  9.7*   HCT  28.5*  29.2*   PLT  195  164        Recent Labs      04/22/18   0214  04/21/18   0308  04/20/18   0250   NA   --   137  137   K   --   4.4  4.2   GLU   --   153*  108*   BUN   --   26*  32*   CREA   --   1.91*  1.89*   CA   --   8.9  8.4*   MG  1.8  1.9  1.9   INR   --   1.6*  1.7*       RECOMMENDATIONS AND DISCHARGE PLANNING     1. Pending tests/procedures/ Plan of Care or Other Needs: blood transfusion and labs     2. Discharge plan for patient and Needs/Barriers: pending    3. Estimated Discharge Date: pending Posted on Whiteboard in Patients Room: yes      4. The patient's care plan was reviewed with the oncoming nurse. \"HEALS\" SAFETY CHECK      Fall Risk    Total Score: 3    Safety Measures: Safety Measures: Bed/Chair-Wheels locked, Bed in low position, Call light within reach    A safety check occurred in the patient's room between off going nurse and oncoming nurse listed above.     The safety check included the below items  Area Items   H  High Alert Medications - Verify all high alert medication drips (heparin, PCA, etc.) E  Equipment - Suction is set up for ALL patients (with triny)  - Red plugs utilized for all equipment (IV pumps, etc.)  - WOWs wiped down at end of shift.  - Room stocked with oxygen, suction, and other unit-specific supplies   A  Alarms - Bed alarm is set for fall risk patients  - Ensure chair alarm is in place and activated if patient is up in a chair   L  Lines - Check IV for any infiltration  - Arshad bag is empty if patient has a Arshad   - Tubing and IV bags are labeled   S  Safety   - Room is clean, patient is clean, and equipment is clean. - Hallways are clear from equipment besides carts. - Fall bracelet on for fall risk patients  - Ensure room is clear and free of clutter  - Suction is set up for ALL patients (with triny)  - Hallways are clear from equipment besides carts.    - Isolation precautions followed, supplies available outside room, sign posted     Roc Cota RN

## 2018-04-22 NOTE — PROGRESS NOTES
[facility]ist Group  Progress Note    Patient: Arlette Cornejo Age: 71 y.o. 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  3- Recent R TKR  4- h/o systolic chf, KD-55%, compenstaed  5- h/o MVR    PLAN  On cardizem,  BB, heparin drip while INR subtherapeutic  reconsulted cardio as afib/flutter still uncontrolled even with higher doses Of BB  Coumadin  Bumex resumed by Cardiology  Advanced care planning: full code  PT, OT  Bowel regimen  D/w patient  Dispo- Home with Balwinder Mcgee soon    Case discussed with:  []Patient  []Family  []Nursing  []Case Management  DVT Prophylaxis:  []Lovenox  []Hep SQ  []SCDs  []Coumadin   [x]On Heparin gtt    Objective:   VS:   Visit Vitals    /49 (BP 1 Location: Left arm, BP Patient Position: At rest)    Pulse 83    Temp 98.2 °F (36.8 °C)    Resp 20    Ht 5' 11\" (1.803 m)    Wt 95.5 kg (210 lb 8 oz)    SpO2 98%    BMI 29.36 kg/m2      Tmax/24hrs: Temp (24hrs), Av.8 °F (36.6 °C), Min:97 °F (36.1 °C), Max:98.4 °F (36.9 °C)      Intake/Output Summary (Last 24 hours) at 18 1243  Last data filed at 18 1118   Gross per 24 hour   Intake              840 ml   Output             4127 ml   Net            -3287 ml       General:  Awake, alert  Cardiovascular:  S1S2+, RRR  Pulmonary:  CTA b/l  GI:  Soft, BS+, NT, ND  Extremities:  trace edema      Labs:    Recent Results (from the past 24 hour(s))   PTT    Collection Time: 18  8:05 PM   Result Value Ref Range    aPTT 79.7 (H) 23.0 - 36.4 SEC   MAGNESIUM    Collection Time: 18  2:14 AM   Result Value Ref Range    Magnesium 1.8 1.6 - 2.6 mg/dL   PTT    Collection Time: 18  2:22 AM   Result Value Ref Range    aPTT 87.4 (H) 23.0 - 36.4 SEC   PROTHROMBIN TIME + INR    Collection Time: 18 10:45 AM Result Value Ref Range    Prothrombin time 17.6 (H) 11.5 - 15.2 sec    INR 1.5 (H) 0.8 - 1.2     METABOLIC PANEL, BASIC    Collection Time: 04/22/18 10:45 AM   Result Value Ref Range    Sodium 136 136 - 145 mmol/L    Potassium 4.0 3.5 - 5.5 mmol/L    Chloride 98 (L) 100 - 108 mmol/L    CO2 33 (H) 21 - 32 mmol/L    Anion gap 5 3.0 - 18 mmol/L    Glucose 172 (H) 74 - 99 mg/dL    BUN 28 (H) 7.0 - 18 MG/DL    Creatinine 1.88 (H) 0.6 - 1.3 MG/DL    BUN/Creatinine ratio 15 12 - 20      GFR est AA 43 (L) >60 ml/min/1.73m2    GFR est non-AA 36 (L) >60 ml/min/1.73m2    Calcium 8.9 8.5 - 10.1 MG/DL       Signed By: Daniel Ventura MD     April 22, 2018

## 2018-04-22 NOTE — PROGRESS NOTES
[facility]  Pulmonary, Critical Care, and Sleep Medicine    Name: Kaylie Mendez MRN: [id number]   : 1945 Hospital: [facility]   Date: 2018        IMPRESSION:   · Wheezing- ? Reactive airways vs mild CHF. Low probability for PE. Slow improvement. · Rapid atrial fibrillation - on cardizem and heparin  · Abdominal pain, Nausea and Vomiting- ? Embolic vs postop ileus. · EMMA - admission creatinine 3.40 (Baseline per chart review 1.3-1.6)- gentle hydration. 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 Underwent mitral valve repair with [place]-Lucien chordae and a 32 mm mitral ring annuloplasty at [facility]      Patient Active Problem List   Diagnosis Code    Rapid atrial fibrillation ([facility]) I48.91    Knee pain M25.569    Atrial fibrillation with rapid ventricular response (HCC) I48.91    Acute kidney injury ([address] N17.9      PLAN:   Resp: Supplemental O2 via NC, titrate flow for goal SPO2> 90%, pulmonary hygiene care, Aspiration precautions, Keep HOB >30 degrees. Will continue bronchodilators and Pulmicort nebulized, short course of prednisone for wheezing  ID: No suspicion for infection at this time, continue to monitor  CVS: Continue cardizem and heparin gtt. Cardiology managing  Renal: Monitor I&Os, Trend Renal indices  Will follow till bronchospasm further improved. Plan to switch to inhaled agents prior to discharge     Subjective/Interval History:     18   Feels much better this am.  No c/o cough, SOB  + wheezing  Denies any further nausea/vomiting. Currently on IV heparin and Cardizem drip. HPI:  Patient is a 71 y. o. male with a history of CHF, AFib, HTN, TIA in  with no residual weakness, and Gout. Patient presented to the ED via EMS with shortness of breath, nausea, and dizziness.  Patient recently had a Right total knee replacement done at [facility]. Was followed up with a home visit where he was found to be tachycardic and had an elevated blood pressure. Started on Cardizem and then Amiodorone gtt, heparin. ROS:Pertinent items are noted in HPI. Objective:   Vital Signs:    Visit Vitals    BP (!) 130/95 (BP 1 Location: Left arm, BP Patient Position: At rest)    Pulse (!) 106    Temp 97.9 °F (36.6 °C)    Resp 20    Ht 5' 11\" (1.803 m)    Wt 95.5 kg (210 lb 8 oz)    SpO2 97%    BMI 29.36 kg/m2       O2 Device: Nasal cannula   O2 Flow Rate (L/min): 2 l/min   Temp (24hrs), Av.8 °F (36.6 °C), Min:97 °F (36.1 °C), Max:98.4 °F (36.9 °C)       Intake/Output:   Last shift:       07 - 1900  In: -   Out: 625 [Urine:625]  Last 3 shifts: 1901 -  07  In: 482 [P.O.:240; I.V.:242]  Out: 5927 [Urine:5925]    Intake/Output Summary (Last 24 hours) at 18 0957  Last data filed at 18 9717   Gross per 24 hour   Intake              240 ml   Output             3802 ml   Net            -3562 ml        Physical Exam:    General: in no apparent distress, well developed and well nourished, non-toxic, in no respiratory distress and acyanotic, alert and oriented times 3   HEENT: Normal   Neck: No abnormally enlarged lymph nodes.    Chest: normal   Lungs: end expiratory wheeze- bilaterally at bases   Heart: irregular rhythm   Abdomen: abdomen is soft without significant tenderness, masses, organomegaly or guarding   Extremity: negative, surgical site-clean   Neuro: alert   Skin: Skin color, texture, turgor normal. No rashes or lesions        DATA:  Labs:  Recent Labs      18   0308  18   0250   WBC  7.1  7.8   HGB  9.3*  9.7*   HCT  28.5*  29.2*   PLT  195  164     Recent Labs      18   0214  18   0308  18   0250   NA   --   137  137   K   --   4.4  4.2   CL   --   104  107   CO2   --   28  23   GLU   --   153*  108*   BUN   --   26*  32*   CREA   -- 1.91*  1.89*   CA   --   8.9  8.4*   MG  1.8  1.9  1.9   INR   --   1.6*  1.7*     No results for input(s): PH, PCO2, PO2, HCO3, FIO2 in the last 72 hours. Echo:pending    Imaging:  [x]I have personally reviewed the patients radiographs    V/Q scan Lun2018:   FINDINGS: The ventilation radiotracer distribution is unremarkable. The  perfusion radiotracer distribution is unremarkable. No evidence of mismatch. IMPRESSION:  1. Low probability ventilation perfusion scan.     CXR Results  (Last 48 hours)    None        High complexity decision making was performed during the evaluation of this patient at high risk for decompensation with multiple organ involvement       Michael Stinson MD

## 2018-04-22 NOTE — ROUTINE PROCESS
Bedside and Verbal shift change report given to Darwin Hamilton (oncoming nurse) by Dawson Mcmahon (offgoing nurse). Report included the following information Kardex, Intake/Output and MAR.

## 2018-04-23 LAB
APTT PPP: 62.4 SEC (ref 23–36.4)
APTT PPP: 93.6 SEC (ref 23–36.4)
INR PPP: 1.7 (ref 0.8–1.2)
MAGNESIUM SERPL-MCNC: 1.8 MG/DL (ref 1.6–2.6)
PROTHROMBIN TIME: 19 SEC (ref 11.5–15.2)

## 2018-04-23 PROCEDURE — 74011250636 HC RX REV CODE- 250/636: Performed by: EMERGENCY MEDICINE

## 2018-04-23 PROCEDURE — 74011636637 HC RX REV CODE- 636/637: Performed by: INTERNAL MEDICINE

## 2018-04-23 PROCEDURE — 74011250637 HC RX REV CODE- 250/637: Performed by: PHYSICIAN ASSISTANT

## 2018-04-23 PROCEDURE — 36415 COLL VENOUS BLD VENIPUNCTURE: CPT | Performed by: NURSE PRACTITIONER

## 2018-04-23 PROCEDURE — 85610 PROTHROMBIN TIME: CPT | Performed by: NURSE PRACTITIONER

## 2018-04-23 PROCEDURE — 85730 THROMBOPLASTIN TIME PARTIAL: CPT | Performed by: EMERGENCY MEDICINE

## 2018-04-23 PROCEDURE — 83735 ASSAY OF MAGNESIUM: CPT | Performed by: NURSE PRACTITIONER

## 2018-04-23 PROCEDURE — 97116 GAIT TRAINING THERAPY: CPT

## 2018-04-23 PROCEDURE — 97110 THERAPEUTIC EXERCISES: CPT

## 2018-04-23 PROCEDURE — 74011000250 HC RX REV CODE- 250: Performed by: NURSE PRACTITIONER

## 2018-04-23 PROCEDURE — 65660000000 HC RM CCU STEPDOWN

## 2018-04-23 PROCEDURE — 74011250637 HC RX REV CODE- 250/637: Performed by: EMERGENCY MEDICINE

## 2018-04-23 PROCEDURE — 85730 THROMBOPLASTIN TIME PARTIAL: CPT | Performed by: NURSE PRACTITIONER

## 2018-04-23 PROCEDURE — 74011250637 HC RX REV CODE- 250/637: Performed by: INTERNAL MEDICINE

## 2018-04-23 RX ORDER — DILTIAZEM HYDROCHLORIDE 30 MG/1
30 TABLET, FILM COATED ORAL
Status: DISCONTINUED | OUTPATIENT
Start: 2018-04-23 | End: 2018-04-24

## 2018-04-23 RX ORDER — WARFARIN 7.5 MG/1
7.5 TABLET ORAL
Status: COMPLETED | OUTPATIENT
Start: 2018-04-23 | End: 2018-04-23

## 2018-04-23 RX ORDER — HEPARIN SODIUM 1000 [USP'U]/ML
40 INJECTION, SOLUTION INTRAVENOUS; SUBCUTANEOUS ONCE
Status: COMPLETED | OUTPATIENT
Start: 2018-04-23 | End: 2018-04-23

## 2018-04-23 RX ORDER — HEPARIN SODIUM 1000 [USP'U]/ML
INJECTION, SOLUTION INTRAVENOUS; SUBCUTANEOUS
Status: DISPENSED
Start: 2018-04-23 | End: 2018-04-23

## 2018-04-23 RX ADMIN — TIOTROPIUM BROMIDE 18 MCG: 18 CAPSULE ORAL; RESPIRATORY (INHALATION) at 07:38

## 2018-04-23 RX ADMIN — HEPARIN SODIUM 3760 UNITS: 1000 INJECTION, SOLUTION INTRAVENOUS; SUBCUTANEOUS at 04:58

## 2018-04-23 RX ADMIN — METOPROLOL TARTRATE 100 MG: 50 TABLET ORAL at 08:58

## 2018-04-23 RX ADMIN — HEPARIN SODIUM 16 UNITS/KG/HR: 10000 INJECTION, SOLUTION INTRAVENOUS at 14:44

## 2018-04-23 RX ADMIN — BUMETANIDE 1 MG: 1 TABLET ORAL at 08:58

## 2018-04-23 RX ADMIN — TIOTROPIUM BROMIDE 18 MCG: 18 CAPSULE ORAL; RESPIRATORY (INHALATION) at 09:00

## 2018-04-23 RX ADMIN — DOCUSATE SODIUM 100 MG: 100 CAPSULE, LIQUID FILLED ORAL at 08:58

## 2018-04-23 RX ADMIN — WARFARIN SODIUM 7.5 MG: 7.5 TABLET ORAL at 17:00

## 2018-04-23 RX ADMIN — PREDNISONE 40 MG: 20 TABLET ORAL at 08:58

## 2018-04-23 RX ADMIN — DOCUSATE SODIUM 100 MG: 100 CAPSULE, LIQUID FILLED ORAL at 16:53

## 2018-04-23 RX ADMIN — BUMETANIDE 1 MG: 1 TABLET ORAL at 16:52

## 2018-04-23 RX ADMIN — METOPROLOL TARTRATE 100 MG: 50 TABLET ORAL at 21:31

## 2018-04-23 RX ADMIN — SODIUM CHLORIDE 20 MG: 9 INJECTION INTRAMUSCULAR; INTRAVENOUS; SUBCUTANEOUS at 08:58

## 2018-04-23 RX ADMIN — DILTIAZEM HYDROCHLORIDE 30 MG: 30 TABLET, FILM COATED ORAL at 12:38

## 2018-04-23 RX ADMIN — DILTIAZEM HYDROCHLORIDE 30 MG: 30 TABLET, FILM COATED ORAL at 16:53

## 2018-04-23 NOTE — PROGRESS NOTES
PT orders received, chart reviewed. Pt unable to participate with PT due to:  []  Nausea/vomiting  []  Eating  []  Pain  []  Pt lethargic  []  Off Unit  []  Pt refused  [x]  Other, pt still has a complete bedrest order active. Please remove for therapy to continue. Will f/u later as patient's schedule allows.  Thank you for this referral.  Lorenzo Whipple, PT, DPT

## 2018-04-23 NOTE — PROGRESS NOTES
[facility]  Pulmonary, Critical Care, and Sleep Medicine    Name: Divya Quiles MRN: [id number]   : 1945 Hospital: [address] [place] [address] Dr   Date: 2018        IMPRESSION:   · Wheezing-Resolved. ? Reactive airways vs mild CHF. Low probability for PE. Slow improvement. 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 Underwent mitral valve repair with [place]-Lucien chordae and a 32 mm mitral ring annuloplasty at [facility]      Patient Active Problem List   Diagnosis Code    Rapid atrial fibrillation (HCC) I48.91    Knee pain M25.569    Atrial fibrillation with rapid ventricular response (HCC) I48.91    Acute kidney injury ([facility] N17.9      PLAN:   Resp: Supplemental O2 via NC, titrate flow for goal SPO2> 90%, pulmonary hygiene care, Aspiration precautions, Keep HOB >30 degrees. Will d/c prednisone, continue SPIRIVA  ID: No suspicion for infection at this time, continue to monitor  CVS: Continue cardizem and heparin gtt. Cardiology managing  Renal: Monitor I&Os, Trend Renal indices  Will sign off and be available as needed     Subjective/Interval History:     18   Feels much better this am.  No c/o cough, SOB  Denies any further nausea/vomiting. Currently on IV heparin and Cardizem drip. HPI:  Patient is a 71 y. o. male with a history of CHF, AFib, HTN, TIA in  with no residual weakness, and Gout. Patient presented to the ED via EMS with shortness of breath, nausea, and dizziness. Patient recently had a Right total knee replacement done at [facility]. Was followed up with a home visit where he was found to be tachycardic and had an elevated blood pressure. Started on Cardizem and then Amiodorone gtt, heparin. ROS:Pertinent items are noted in HPI.     Objective: Vital Signs:    Visit Vitals    BP (!) 120/91 (BP 1 Location: Right arm, BP Patient Position: At rest)    Pulse (!) 105    Temp 98.5 °F (36.9 °C)    Resp 18    Ht 5' 11\" (1.803 m)    Wt 94.1 kg (207 lb 6.4 oz)    SpO2 97%    BMI 28.93 kg/m2       O2 Device: Nasal cannula   O2 Flow Rate (L/min): 2 l/min   Temp (24hrs), Av.2 °F (36.8 °C), Min:97.6 °F (36.4 °C), Max:98.5 °F (36.9 °C)       Intake/Output:   Last shift:       07 - 1900  In: -   Out: 550 [Urine:550]  Last 3 shifts: 1901 -  07  In: 1769.8 [P.O.:1560; I.V.:209.8]  Out: 5977 [Urine:5975]    Intake/Output Summary (Last 24 hours) at 18 1104  Last data filed at 18 0830   Gross per 24 hour   Intake           1769.8 ml   Output             3600 ml   Net          -1830.2 ml        Physical Exam:    General: in no apparent distress, well developed and well nourished, non-toxic, in no respiratory distress and acyanotic, alert and oriented times 3   HEENT: Normal   Neck: No abnormally enlarged lymph nodes. Chest: normal   Lungs: clear to auscultation   Heart: irregular rhythm   Abdomen: abdomen is soft without significant tenderness, masses, organomegaly or guarding   Extremity: negative, surgical site-clean   Neuro: alert   Skin: Skin color, texture, turgor normal. No rashes or lesions        DATA:  Labs:  Recent Labs      18   0308   WBC  7.1   HGB  9.3*   HCT  28.5*   PLT  195     Recent Labs      18   0320  18   1045  18   0214  18   0308   NA   --   136   --   137   K   --   4.0   --   4.4   CL   --   98*   --   104   CO2   --   33*   --   28   GLU   --   172*   --   153*   BUN   --   28*   --   26*   CREA   --   1.88*   --   1.91*   CA   --   8.9   --   8.9   MG  1.8   --   1.8  1.9   INR  1.7*  1.5*   --   1.6*     No results for input(s): PH, PCO2, PO2, HCO3, FIO2 in the last 72 hours.                                                     Imaging:  [x]I have personally reviewed the patients radiographs    V/Q scan Lun2018:   FINDINGS: The ventilation radiotracer distribution is unremarkable. The  perfusion radiotracer distribution is unremarkable. No evidence of mismatch. IMPRESSION:  1. Low probability ventilation perfusion scan.     CXR Results  (Last 48 hours)    None        Moderate complexity decision making was performed during the evaluation of this patient at high risk for decompensation with multiple organ involvement       Michael Stinson MD

## 2018-04-23 NOTE — ROUTINE PROCESS
Pt alert and oriented x 4. No distress noted during shift. Pt denies pain during shift. Pt sitting up in chair for majority of shift. Heparin gtt continues to administer at 16 units/kg/hr. Next ptt 4/24/18 0400. Coumadin given. Cardizem drip discontinued per order and PO cardizem initiated. HR between  at this time. Pt right knee incision continues to be clean, dry, and intact. Pt tolerating all meals. Call bell within reach. Fall precautions maintained. Will continue to monitor. Bedside shift change report given to Lala Lizama RN (oncoming nurse) by Gerald Vieira Rn (offgoing nurse). Report included the following information SBAR, Kardex, Intake/Output, MAR, Recent Results and Cardiac Rhythm Afib.

## 2018-04-23 NOTE — PROGRESS NOTES
Problem: Mobility Impaired (Adult and Pediatric)  Goal: *Acute Goals and Plan of Care (Insert Text)  Physical Therapy Goals  Initiated 4/20/2018 and to be accomplished within 7 day(s)  1. Patient will move from supine to sit and sit to supine , scoot up and down and roll side to side in bed with supervision/set-up. 2.  Patient will transfer from bed to chair and chair to bed with supervision/set-up using the least restrictive device. 3.  Patient will perform sit to stand with supervision/set-up. 4.  Patient will ambulate with supervision/set-up for >150 feet with the least restrictive device. 5.  Patient will ascend/descend 4 stairs with 1 handrail(s) with supervision/set-up. Outcome: Progressing Towards Goal  physical Therapy TREATMENT    Patient: Hermila Andrade (89 y.o. male)  Date: 4/23/2018  Diagnosis: Rapid atrial fibrillation (HCC)  Rapid atrial fibrillation (HCC)  Atrial fibrillation with rapid ventricular response (HCC)  Acute kidney injury ([address]  Knee pain Rapid atrial fibrillation (HCC)       Precautions:     Chart, physical therapy assessment, plan of care and goals were reviewed. ASSESSMENT:  Patient is cleared by nursing for PT, and patient consents to therapy. Pt already sitting EOB. Performed therex seated and standing. Recommend bringing a theraband next visit for terminal knee extension exercise due to decreased knee extension with LAQ. Performed sit to stands supervision. Gait training 20 feet with cues for sequencing CGA. Pt's HR continues to be increased between 110-137 with all mobility with rest breaks as needed. Pt ended therapy sitting up in chair with all needs met.    Progression toward goals:  [x]      Improving appropriately and progressing toward goals  []      Improving slowly and progressing toward goals  []      Not making progress toward goals and plan of care will be adjusted     PLAN:  Patient continues to benefit from skilled intervention to address the above impairments to increase functional independence. Continue treatment per established plan of care. Discharge Recommendations:  Home Health VS Outpatient  Further Equipment Recommendations for Discharge:  rolling walker     SUBJECTIVE:   Patient stated I can't get my new gown on.    OBJECTIVE DATA SUMMARY:   Critical Behavior:  Neurologic State: Alert  Orientation Level: Oriented X4  Cognition: Follows commands     Functional Mobility Training:  Bed Mobility:  Scooting: Modified independent  Transfers:  Sit to Stand: Supervision  Stand to Sit: Supervision  Balance:  Sitting: Intact  Standing: Impaired; With support  Standing - Static: Good  Standing - Dynamic : Fair  Ambulation/Gait Training:  Distance (ft): 20 Feet (ft)  Assistive Device: Walker, rolling  Ambulation - Level of Assistance: Contact guard assistance  Gait Abnormalities: Antalgic  Right Side Weight Bearing: As tolerated  Stance: Right decreased; Left increased  Speed/Dorothy: Pace decreased (<100 feet/min)  Step Length: Right shortened;Left shortened  Therapeutic Exercises:   Seated including ankle pumps, LAQ, marching, and heel slides R LE x 15 reps. Standing terminal knee extension (TKE) with towel x 15 reps. Pain:  Pre: 6-7/10 R knee  Post: 6-7/10 R knee  Activity Tolerance:   fair  Please refer to the flowsheet for vital signs taken during this treatment. After treatment:   [x] Patient left in no apparent distress sitting up in chair  [] Patient left in no apparent distress in bed  [x] Call bell left within reach  [x] Nursing notified Brenna Mendez  [] Caregiver present  [] Bed alarm activated  [x] Personal items in reach      Gabe Rsosi, PT, DPT   Time Calculation: 26 mins    Mobility  Current  CJ= 20-39%   Goal  CI= 1-19%. The severity rating is based on the Level of Assistance required for Functional Mobility and ADLs.

## 2018-04-23 NOTE — PROGRESS NOTES
[facility]  -  Progress Note      Patient: Marissa Justin MRN: [id number]  SSN: [id number]    YOB: 1945  Age: 71 y.o. Sex: male      Admit Date: 4/18/2018    Assessment:     Hospital Problems  Date Reviewed: 4/19/2018          Codes Class Noted POA    Knee pain ICD-10-CM: M25.569  ICD-9-CM: 719.46  4/19/2018 Unknown        Atrial fibrillation with rapid ventricular response ([facility] [address] ICD-10-CM: I48.91  ICD-9-CM: 427.31  4/19/2018 Unknown        Acute kidney injury [facility]) ICD-10-CM: N17.9  ICD-9-CM: 584.9  4/19/2018 Unknown        * (Principal)Rapid atrial fibrillation (HCC) ICD-10-CM: I48.91  ICD-9-CM: 427.31  4/18/2018 Unknown            -Persistent atrial fibrillation, presented with RVR, in setting of recent difficulty taking pills due to N/V. Patient taking metoprolol 50 mg bid for rate control as outpatient, warfarin 3 mg daily for [address] INR at 1.7   -Hypertension, elevated on admission.  -Nausea and vomiting, persistent over the few days, now resolved. -Acute on chronic kidney injury, suspect intravascular volume depletion in setting of N/V, improved with hydration. Creatinine 1.8 4/12/2018.  -Chronic diastolic heart failure. Normal LV function EF 55% on echo 3/2018 at [facility] office. On maintenance Bumex 2 mg bid as outpatient. Will need to follow fluid status closely on fluids for EMMA. -Recent right knee surgery. Discharge from [facility] last Friday, n/v since then  -H/o of endocarditis s/p [place]-Lucien chordae and a 32 mm mitral ring annuloplasty at [facility] 2002. Moderate MR on echo 2013.  -H/o TIA.     Primary cardiologist Dr. Genna Cruz. Reviewed records received from their office this am.    Plan:     His rates are in the low 100 range at this point, but had been in the 120-130 range over the weekend. Will stop diltiazem drip and place on oral diltiazem to titrate up as needed. Cannot give digoxin due to elevated creatinine.     Subjective:     Patient without symptoms but his rate has been elevated over the weekend in the 100-120 range. Placed back on diltiazem drip at 5mg/min.     Objective:      Patient Vitals for the past 8 hrs:   Temp Pulse Resp BP SpO2   04/23/18 0743 98.5 °F (36.9 °C) (!) 105 18 (!) 120/91 97 %   04/23/18 0313 97.8 °F (36.6 °C) (!) 101 18 154/89 98 %         Patient Vitals for the past 96 hrs:   Weight   04/23/18 0313 94.1 kg (207 lb 6.4 oz)   04/22/18 0436 95.5 kg (210 lb 8 oz)   04/21/18 0408 96.4 kg (212 lb 8 oz)   04/20/18 0431 99.7 kg (219 lb 14.4 oz)         Intake/Output Summary (Last 24 hours) at 04/23/18 1111  Last data filed at 04/23/18 0830   Gross per 24 hour   Intake           1769.8 ml   Output             3600 ml   Net          -1830.2 ml       Physical Exam:  General:  alert, cooperative, no distress, appears stated age  Neck:  nontender, no JVD  Lungs:  clear to auscultation bilaterally  Heart:  irregularly irregular rhythm  Extremities:  extremities normal, atraumatic, no cyanosis or edema    Data Review:     Labs: Results:       Chemistry Recent Labs      04/23/18   0320  04/22/18   1045  04/22/18   0214  04/21/18   0308   GLU   --   172*   --   153*   NA   --   136   --   137   K   --   4.0   --   4.4   CL   --   98*   --   104   CO2   --   33*   --   28   BUN   --   28*   --   26*   CREA   --   1.88*   --   1.91*   CA   --   8.9   --   8.9   MG  1.8   --   1.8  1.9   AGAP   --   5   --   5   BUCR   --   15   --   14      CBC w/Diff Recent Labs      04/21/18   0308   WBC  7.1   RBC  3.06*   HGB  9.3*   HCT  28.5*   PLT  195   GRANS  87*   LYMPH  7*   EOS  0      Cardiac Enzymes No results found for: CPK, CK, CKMMB, CKMB, RCK3, CKMBT, CKNDX, CKND1, HARSH, TROPT, TROIQ, BENJI, TROPT, TNIPOC, BNP, BNPP   Coagulation Recent Labs      04/23/18   0320  04/22/18   1045  04/22/18   0222   PTP  19.0*  17.6*   --    INR  1.7*  1.5*   --    APTT  62.4*   --   87.4*       Lipid Panel No results found for: CHOL, CHOLPOCT, CHOLX, CHLST, CHOLV, [id number], HDL, LDL, LDLC, DLDLP, 451644, VLDLC, VLDL, TGLX, TRIGL, TRIGP, TGLPOCT, CHHD, CHHDX   BNP No results found for: BNP, BNPP, XBNPT   Liver Enzymes No results for input(s): TP, ALB, TBIL, AP, SGOT, GPT in the last 72 hours.     No lab exists for component: DBIL   Digoxin    Thyroid Studies No results found for: T4, T3U, TSH, TSHEXT

## 2018-04-23 NOTE — PROGRESS NOTES
[facility]  Progress Note    Patient: Marissa Justin Age: 71 y.o. 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  3- Recent R TKR  4- h/o systolic chf, CQ-60%, compenstaed  5- h/o MVR    PLAN  Increase cardizem drip,  BB, heparin drip while INR subtherapeutic  Cardio reconsulted, also d/w Cardio PA Brendan  Coumadin  Bumex resumed by Cardiology  Advanced care planning: full code  PT, OT  Bowel regimen  D/w patient  [address] [facility] with [facility] when HR better controlled  D/w RN  D/w     Case discussed with:  []Patient  []Family  []Nursing  []Case Management  DVT Prophylaxis:  []Lovenox  []Hep SQ  []SCDs  []Coumadin   [x]On Heparin gtt    Objective:   VS:   Visit Vitals    BP (!) 120/91 (BP 1 Location: Right arm, BP Patient Position: At rest)    Pulse (!) 105    Temp 98.5 °F (36.9 °C)    Resp 18    Ht 5' 11\" (1.803 m)    Wt 94.1 kg (207 lb 6.4 oz)    SpO2 97%    BMI 28.93 kg/m2      Tmax/24hrs: Temp (24hrs), Av.2 °F (36.8 °C), Min:97.6 °F (36.4 °C), Max:98.5 °F (36.9 °C)      Intake/Output Summary (Last 24 hours) at 18 1058  Last data filed at 18 0830   Gross per 24 hour   Intake           1769.8 ml   Output             3600 ml   Net          -1830.2 ml       General:  Awake, alert  Cardiovascular:  S1S2+, RRR  Pulmonary:  CTA b/l  GI:  Soft, BS+, NT, ND  Extremities:  No edema        Labs:    Recent Results (from the past 24 hour(s))   MAGNESIUM    Collection Time: 18  3:20 AM   Result Value Ref Range    Magnesium 1.8 1.6 - 2.6 mg/dL   PROTHROMBIN TIME + INR    Collection Time: 18  3:20 AM   Result Value Ref Range    Prothrombin time 19.0 (H) 11.5 - 15.2 sec    INR 1.7 (H) 0.8 - 1.2     PTT    Collection Time: 18  3:20 AM   Result Value Ref Range    aPTT 62.4 (H) 23.0 - 36.4 [address]       Signed By: Tesfaye Philippe MD     April 23, 2018

## 2018-04-23 NOTE — PROGRESS NOTES
Problem: Falls - Risk of  Goal: *Absence of Falls  Document Alia Fall Risk and appropriate interventions in the flowsheet.    Outcome: Progressing Towards Goal  Fall Risk Interventions:  Mobility Interventions: OT consult for ADLs         Medication Interventions: Bed/chair exit alarm    Elimination Interventions: Bed/chair exit alarm, Call light in reach

## 2018-04-23 NOTE — PROGRESS NOTES
Dr. Mone Quiles notified of MEWs score of 3 due to BP: 137/95 P: 117. Dr. Mone Quiles gave telephone orders to change cardizem drip to administer at 10 ml/hr.

## 2018-04-24 VITALS
TEMPERATURE: 96.1 F | BODY MASS INDEX: 28 KG/M2 | HEIGHT: 71 IN | HEART RATE: 83 BPM | WEIGHT: 200 LBS | OXYGEN SATURATION: 96 % | DIASTOLIC BLOOD PRESSURE: 77 MMHG | SYSTOLIC BLOOD PRESSURE: 130 MMHG | RESPIRATION RATE: 16 BRPM

## 2018-04-24 LAB
APTT PPP: 88.7 SEC (ref 23–36.4)
BASOPHILS # BLD: 0 K/UL (ref 0–0.06)
BASOPHILS NFR BLD: 0 % (ref 0–3)
DIFFERENTIAL METHOD BLD: ABNORMAL
EOSINOPHIL # BLD: 0.2 K/UL (ref 0–0.4)
EOSINOPHIL NFR BLD: 1 % (ref 0–5)
ERYTHROCYTE [DISTWIDTH] IN BLOOD BY AUTOMATED COUNT: 16.6 % (ref 11.6–14.5)
HCT VFR BLD AUTO: 34.6 % (ref 36–48)
HGB BLD-MCNC: 11.5 G/DL (ref 13–16)
INR PPP: 1.9 (ref 0.8–1.2)
LYMPHOCYTES # BLD: 1.4 K/UL (ref 0.8–3.5)
LYMPHOCYTES NFR BLD: 9 % (ref 20–51)
MAGNESIUM SERPL-MCNC: 1.9 MG/DL (ref 1.6–2.6)
MCH RBC QN AUTO: 31 PG (ref 24–34)
MCHC RBC AUTO-ENTMCNC: 33.2 G/DL (ref 31–37)
MCV RBC AUTO: 93.3 FL (ref 74–97)
MONOCYTES # BLD: 0.6 K/UL (ref 0–1)
MONOCYTES NFR BLD: 4 % (ref 2–9)
NEUTS SEG # BLD: 12.8 K/UL (ref 1.8–8)
NEUTS SEG NFR BLD: 86 % (ref 42–75)
NRBC BLD-RTO: 1 PER 100 WBC
PLATELET # BLD AUTO: 291 K/UL (ref 135–420)
PLATELET COMMENTS,PCOM: ABNORMAL
PMV BLD AUTO: 10.9 FL (ref 9.2–11.8)
PROTHROMBIN TIME: 21.5 SEC (ref 11.5–15.2)
RBC # BLD AUTO: 3.71 M/UL (ref 4.7–5.5)
RBC MORPH BLD: ABNORMAL
WBC # BLD AUTO: 15 K/UL (ref 4.6–13.2)

## 2018-04-24 PROCEDURE — 74011250637 HC RX REV CODE- 250/637: Performed by: EMERGENCY MEDICINE

## 2018-04-24 PROCEDURE — 74011000250 HC RX REV CODE- 250: Performed by: NURSE PRACTITIONER

## 2018-04-24 PROCEDURE — 36415 COLL VENOUS BLD VENIPUNCTURE: CPT | Performed by: NURSE PRACTITIONER

## 2018-04-24 PROCEDURE — 94640 AIRWAY INHALATION TREATMENT: CPT

## 2018-04-24 PROCEDURE — 74011250637 HC RX REV CODE- 250/637: Performed by: PHYSICIAN ASSISTANT

## 2018-04-24 PROCEDURE — 83735 ASSAY OF MAGNESIUM: CPT | Performed by: NURSE PRACTITIONER

## 2018-04-24 PROCEDURE — 85730 THROMBOPLASTIN TIME PARTIAL: CPT | Performed by: NURSE PRACTITIONER

## 2018-04-24 PROCEDURE — 97530 THERAPEUTIC ACTIVITIES: CPT

## 2018-04-24 PROCEDURE — 85025 COMPLETE CBC W/AUTO DIFF WBC: CPT | Performed by: EMERGENCY MEDICINE

## 2018-04-24 PROCEDURE — 85610 PROTHROMBIN TIME: CPT | Performed by: NURSE PRACTITIONER

## 2018-04-24 RX ORDER — FACIAL-BODY WIPES
10 EACH TOPICAL
Qty: 10 SUPPOSITORY | Refills: 0 | Status: SHIPPED | OUTPATIENT
Start: 2018-04-24

## 2018-04-24 RX ORDER — METOPROLOL TARTRATE 100 MG/1
100 TABLET ORAL EVERY 12 HOURS
Qty: 60 TAB | Refills: 0 | Status: SHIPPED | OUTPATIENT
Start: 2018-04-24

## 2018-04-24 RX ORDER — DILTIAZEM HYDROCHLORIDE 180 MG/1
180 CAPSULE, COATED, EXTENDED RELEASE ORAL DAILY
Qty: 30 CAP | Refills: 0 | Status: SHIPPED | OUTPATIENT
Start: 2018-04-24

## 2018-04-24 RX ORDER — WARFARIN 6 MG/1
TABLET ORAL
Qty: 30 TAB | Refills: 0 | Status: SHIPPED | OUTPATIENT
Start: 2018-04-24

## 2018-04-24 RX ORDER — DILTIAZEM HYDROCHLORIDE 30 MG/1
30 TABLET, FILM COATED ORAL
Qty: 90 TAB | Refills: 0 | Status: SHIPPED | OUTPATIENT
Start: 2018-04-24 | End: 2018-04-24

## 2018-04-24 RX ORDER — DILTIAZEM HYDROCHLORIDE 180 MG/1
180 CAPSULE, COATED, EXTENDED RELEASE ORAL DAILY
Status: DISCONTINUED | OUTPATIENT
Start: 2018-04-24 | End: 2018-04-24 | Stop reason: HOSPADM

## 2018-04-24 RX ORDER — DOCUSATE SODIUM 100 MG/1
100 CAPSULE, LIQUID FILLED ORAL 2 TIMES DAILY
Qty: 60 CAP | Refills: 0 | Status: SHIPPED | OUTPATIENT
Start: 2018-04-24

## 2018-04-24 RX ORDER — BUMETANIDE 1 MG/1
1 TABLET ORAL 2 TIMES DAILY
Qty: 60 TAB | Refills: 0 | Status: SHIPPED | OUTPATIENT
Start: 2018-04-24

## 2018-04-24 RX ORDER — OXYCODONE AND ACETAMINOPHEN 5; 325 MG/1; MG/1
1 TABLET ORAL
Qty: 12 TAB | Refills: 0 | Status: SHIPPED | OUTPATIENT
Start: 2018-04-24 | End: 2022-01-10

## 2018-04-24 RX ADMIN — TIOTROPIUM BROMIDE 18 MCG: 18 CAPSULE ORAL; RESPIRATORY (INHALATION) at 07:47

## 2018-04-24 RX ADMIN — BUMETANIDE 1 MG: 1 TABLET ORAL at 08:19

## 2018-04-24 RX ADMIN — METOPROLOL TARTRATE 100 MG: 50 TABLET ORAL at 08:19

## 2018-04-24 RX ADMIN — DOCUSATE SODIUM 100 MG: 100 CAPSULE, LIQUID FILLED ORAL at 08:20

## 2018-04-24 RX ADMIN — SODIUM CHLORIDE 20 MG: 9 INJECTION INTRAMUSCULAR; INTRAVENOUS; SUBCUTANEOUS at 08:20

## 2018-04-24 RX ADMIN — DILTIAZEM HYDROCHLORIDE 30 MG: 30 TABLET, FILM COATED ORAL at 08:19

## 2018-04-24 RX ADMIN — DILTIAZEM HYDROCHLORIDE 180 MG: 180 CAPSULE, COATED, EXTENDED RELEASE ORAL at 10:42

## 2018-04-24 NOTE — PROGRESS NOTES
Problem: Falls - Risk of  Goal: *Absence of Falls  Document Alia Fall Risk and appropriate interventions in the flowsheet.    Outcome: Progressing Towards Goal  Fall Risk Interventions:  Mobility Interventions: OT consult for ADLs, Patient to call before getting OOB, PT Consult for mobility concerns, PT Consult for assist device competence         Medication Interventions: Evaluate medications/consider consulting pharmacy, Patient to call before getting OOB, Teach patient to arise slowly    Elimination Interventions: Call light in reach, Patient to call for help with toileting needs, Toileting schedule/hourly rounds, Urinal in reach

## 2018-04-24 NOTE — PROGRESS NOTES
MEW's = 3 due to elevated hear trate ( due to AFIB ), heart rate has fluctuated from the 90's to the 120's most of this shift.  Pt remains asymptomatic.      04/24/18 0404   Vitals   Temp 98.3 °F (36.8 °C)   Temp Source Oral   Pulse (Heart Rate) (!) 117  (notified nursehobb rn)   Heart Rate Source Monitor   Resp Rate 20   O2 Sat (%) 93 %   Level of Consciousness Alert   BP (!) 130/98  (notified nurse rnhooo)   MAP (Calculated) 109   BP 1 Location Left arm   BP 1 Method Automatic   BP Patient Position At rest   MEWS Score 3   Pain 1   Pain Scale 1 Numeric (0 - 10)   Pain Intensity 1 0   Patient Stated Pain Goal 0

## 2018-04-24 NOTE — PROGRESS NOTES
MEW's score = 3 due to elevated heart rate (AFIB), pt is asymptomatic.      04/23/18 1934   Vitals   Temp 98 °F (36.7 °C)   Temp Source Oral   Pulse (Heart Rate) (!) 127  (lnotified nurse janeenrel rn)   Heart Rate Source Monitor   Resp Rate 20   O2 Sat (%) 97 %   Level of Consciousness Alert   /76   MAP (Calculated) 100   BP 1 Location Left arm   BP 1 Method Automatic   BP Patient Position Sitting   MEWS Score 3   Patient Observation   Repositioned Head of bed elevated (degrees)   Patient Turned Turns self   Observations vital   Ambulate No (Comment)   Activity In bed

## 2018-04-24 NOTE — PROGRESS NOTES
Pt provided FOC for home health for a safe discharge, pt chose [facility]. Referral placed in e-discharge on behalf of this pt.

## 2018-04-24 NOTE — PROGRESS NOTES
Problem: Mobility Impaired (Adult and Pediatric)  Goal: *Acute Goals and Plan of Care (Insert Text)  Physical Therapy Goals  Initiated 4/20/2018 and to be accomplished within 7 day(s)  1. Patient will move from supine to sit and sit to supine , scoot up and down and roll side to side in bed with supervision/set-up. 2.  Patient will transfer from bed to chair and chair to bed with supervision/set-up using the least restrictive device. 3.  Patient will perform sit to stand with supervision/set-up. 4.  Patient will ambulate with supervision/set-up for >150 feet with the least restrictive device. 5.  Patient will ascend/descend 4 stairs with 1 handrail(s) with supervision/set-up. Outcome: Progressing Towards Goal  physical Therapy TREATMENT    Patient: Irene Denson (89 y.o. male)  Date: 4/24/2018  Diagnosis: Rapid atrial fibrillation (HCC)  Rapid atrial fibrillation (HCC)  Atrial fibrillation with rapid ventricular response (HCC)  Acute kidney injury ([address]  Knee pain Rapid atrial fibrillation (HCC)       Precautions:   WBAT  Chart, physical therapy assessment, plan of care and goals were reviewed. ASSESSMENT:  Patient is cleared by nursing for PT, and patient consents to therapy. Pt up in bathroom with nursing at beginning. Gait 10 feet RW supervision. Sit to stands supervision. Performed heel slides and LAQ only for therex due to HR. Educated pt on energy conservation. Educated pt on exercises. Recommend pt using RW for bathroom transfers. Pt's HR more elevated today during therapy and limited pt's ability to participate. Pt felt tired after a few activities with PT today. Pt's HR between 123-154 including in the 130 and 140s during seated exercises. Pt ended therapy sitting up in chair with all needs met.      Progression toward goals:  []      Improving appropriately and progressing toward goals  [x]      Improving slowly and progressing toward goals  []      Not making progress toward goals and plan of care will be adjusted     PLAN:  Patient continues to benefit from skilled intervention to address the above impairments to increase functional independence. Continue treatment per established plan of care. Discharge Recommendations:  Home Health vs Outpatient  Further Equipment Recommendations for Discharge:  rolling walker     SUBJECTIVE:   Patient stated I have been doing my exercises.     OBJECTIVE DATA SUMMARY:   Critical Behavior:  Neurologic State: Alert  Orientation Level: Oriented X4  Cognition: Follows commands     Functional Mobility Training:  Bed Mobility:  Scooting: Modified independent  Transfers:  Sit to Stand: Supervision  Stand to Sit: Supervision  Balance:  Sitting: Intact  Standing: Impaired; With support  Standing - Static: Good  Standing - Dynamic : Fair  Ambulation/Gait Training:  Distance (ft): 10 Feet (ft)  Assistive Device: Walker, rolling  Ambulation - Level of Assistance: Stand-by assistance  Gait Abnormalities: Antalgic  Right Side Weight Bearing: As tolerated  Speed/Dorothy: Pace decreased (<100 feet/min)  Step Length: Right shortened;Left shortened  Therapeutic Exercises:   R LE x 15 reps including LAQ and heel slides. Increased knee extension noted with LAQ. Pain:  Pre: 0/10 R knee  Post: min R knee  Activity Tolerance:   Poor due to HR  Please refer to the flowsheet for vital signs taken during this treatment. After treatment:   [x] Patient left in no apparent distress sitting up in chair  [] Patient left in no apparent distress in bed  [x] Call bell left within reach  [x] Nursing notified Juany Gutierrez  [] Caregiver present  [] Bed alarm activated  [x] Personal items in reach      Mary Ellen Reese, PT, DPT   Time Calculation: 19 mins    Mobility  Current  CI= 1-19%   Goal  CI= 1-19%. The severity rating is based on the Level of Assistance required for Functional Mobility and ADLs.

## 2018-04-24 NOTE — PROGRESS NOTES
[facility] - Progress Note  Admit Date: 4/18/2018    Assessment:     Hospital Problems  Date Reviewed: 4/19/2018          Codes Class Noted POA    Knee pain ICD-10-CM: M25.569  ICD-9-CM: 719.46  4/19/2018 Unknown        Atrial fibrillation with rapid ventricular response ([facility] [address] ICD-10-CM: I48.91  ICD-9-CM: 427.31  4/19/2018 Unknown        Acute kidney injury [facility]) ICD-10-CM: N17.9  ICD-9-CM: 584.9  4/19/2018 Unknown        * (Principal)Rapid atrial fibrillation (HCC) ICD-10-CM: I48.91  ICD-9-CM: 427.31  4/18/2018 Unknown              -Persistent atrial fibrillation, presented with RVR, in setting of recent difficulty taking pills due to N/V. Patient taking metoprolol 50 mg bid for rate control as outpatient, warfarin 3 mg daily for [address] INR at 1.7   -Hypertension, elevated on admission.  -Nausea and vomiting, persistent over the few days, now resolved. -Acute on chronic kidney injury, suspect intravascular volume depletion in setting of N/V, improved with hydration. Creatinine 1.8 4/12/2018.  -Chronic diastolic heart failure. Normal LV function EF 55% on echo 3/2018 at [facility] office. On maintenance Bumex 2 mg bid as outpatient. Will need to follow fluid status closely on fluids for EMMA. -Recent right knee surgery. 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 Moderate MR on echo 2013.  -H/o TIA.     Primary cardiologist Dr. Shefali Gutierrez. Reviewed records received from their office this am.    Plan:     HR remains 100-130s, asymptomatic. Will increase dose/transition to long acting, will continue lopressor. Anticoagulated with warfarin, INR 1.9. Volume status appears stable, continued on po bume (lowe than home dose in setting of EMMA). WIll need close outpatient follow up of renal function and fluid status.   Hopefull home later today if HR stable, patient concerned about no ride after 1 PM however. Subjective:     No new complaints. Objective:      Patient Vitals for the past 8 hrs:   Temp Pulse Resp BP SpO2   04/24/18 0817 - 62 - (!) 154/98 -   04/24/18 0730 97.1 °F (36.2 °C) (!) 120 16 (!) 154/105 96 %   04/24/18 0404 98.3 °F (36.8 °C) (!) 117 20 (!) 130/98 93 %         Patient Vitals for the past 96 hrs:   Weight   04/24/18 0437 90.7 kg (200 lb)   04/23/18 0313 94.1 kg (207 lb 6.4 oz)   04/22/18 0436 95.5 kg (210 lb 8 oz)   04/21/18 0408 96.4 kg (212 lb 8 oz)                    Intake/Output Summary (Last 24 hours) at 04/24/18 0934  Last data filed at 04/24/18 0544   Gross per 24 hour   Intake          1043.23 ml   Output             2150 ml   Net         -1106.77 ml       Physical Exam:  General:  alert, cooperative, no distress, appears stated age  Neck:  no JVD  Lungs:  clear to auscultation bilaterally  Heart:  irregularly irregular rhythm  Abdomen:  abdomen is soft without significant tenderness, masses, organomegaly or guarding  Extremities:  extremities normal, atraumatic, no cyanosis or edema    Data Review:     Labs: Results:       Chemistry Recent Labs      04/24/18   0222  04/23/18   0320  04/22/18   1045  04/22/18   0214   GLU   --    --   172*   --    NA   --    --   136   --    K   --    --   4.0   --    CL   --    --   98*   --    CO2   --    --   33*   --    BUN   --    --   28*   --    CREA   --    --   1.88*   --    CA   --    --   8.9   --    MG  1.9  1.8   --   1.8   AGAP   --    --   5   --    BUCR   --    --   15   --       CBC w/Diff No results for input(s): WBC, RBC, HGB, HCT, PLT, GRANS, LYMPH, EOS, HGBEXT, HCTEXT, PLTEXT in the last 72 hours.    Cardiac Enzymes No results found for: CPK, CK, CKMMB, CKMB, RCK3, CKMBT, CKNDX, CKND1, HARSH, TROPT, TROIQ, BENJI, TROPT, TNIPOC, BNP, BNPP   Coagulation Recent Labs      04/24/18   0222  04/23/18   1218  04/23/18   0320   PTP  21.5*   --   19.0*   INR  1.9*   --   1.7*   APTT  88.7*  93.6*  62.4*       Lipid Panel No results found for: CHOL, CHOLPOCT, CHOLX, CHLST, CHOLV, 387815, HDL, LDL, LDLC, DLDLP, 305582, VLDLC, VLDL, TGLX, TRIGL, TRIGP, TGLPOCT, CHHD, CHHDX   BNP No results found for: BNP, BNPP, XBNPT   Liver Enzymes No results for input(s): TP, ALB, TBIL, AP, SGOT, GPT in the last 72 hours.     No lab exists for component: DBIL   Digoxin    Thyroid Studies No results found for: T4, T3U, TSH, TSHEXT       Signed By: MINDY Brand     April 24, 2018

## 2021-08-03 PROBLEM — I48.91 ATRIAL FIBRILLATION WITH RAPID VENTRICULAR RESPONSE (HCC): Status: RESOLVED | Noted: 2018-04-19 | Resolved: 2021-08-03
